# Patient Record
Sex: MALE | Race: WHITE | NOT HISPANIC OR LATINO | Employment: FULL TIME
[De-identification: names, ages, dates, MRNs, and addresses within clinical notes are randomized per-mention and may not be internally consistent; named-entity substitution may affect disease eponyms.]

---

## 2017-08-28 ENCOUNTER — RECORDS - HEALTHEAST (OUTPATIENT)
Dept: ADMINISTRATIVE | Facility: OTHER | Age: 32
End: 2017-08-28

## 2017-08-28 LAB
LAB AP CHARGES (HE HISTORICAL CONVERSION): NORMAL
PATH REPORT.COMMENTS IMP SPEC: NORMAL
PATH REPORT.COMMENTS IMP SPEC: NORMAL
PATH REPORT.FINAL DX SPEC: NORMAL
PATH REPORT.GROSS SPEC: NORMAL
PATH REPORT.MICROSCOPIC SPEC OTHER STN: NORMAL
PATH REPORT.MICROSCOPIC SPEC OTHER STN: NORMAL
PATH REPORT.RELEVANT HX SPEC: NORMAL
RESULT FLAG (HE HISTORICAL CONVERSION): NORMAL

## 2018-02-16 ENCOUNTER — RECORDS - HEALTHEAST (OUTPATIENT)
Dept: LAB | Facility: CLINIC | Age: 33
End: 2018-02-16

## 2018-02-19 LAB
BACTERIA SPEC CULT: ABNORMAL
BACTERIA SPEC CULT: ABNORMAL

## 2020-07-01 ENCOUNTER — COMMUNICATION - HEALTHEAST (OUTPATIENT)
Dept: TELEHEALTH | Facility: CLINIC | Age: 35
End: 2020-07-01

## 2020-07-01 ENCOUNTER — OFFICE VISIT - HEALTHEAST (OUTPATIENT)
Dept: FAMILY MEDICINE | Facility: CLINIC | Age: 35
End: 2020-07-01

## 2020-07-01 DIAGNOSIS — F32.1 CURRENT MODERATE EPISODE OF MAJOR DEPRESSIVE DISORDER WITHOUT PRIOR EPISODE (H): ICD-10-CM

## 2020-07-01 DIAGNOSIS — F41.1 GENERALIZED ANXIETY DISORDER: ICD-10-CM

## 2020-07-01 ASSESSMENT — ANXIETY QUESTIONNAIRES
3. WORRYING TOO MUCH ABOUT DIFFERENT THINGS: NEARLY EVERY DAY
5. BEING SO RESTLESS THAT IT IS HARD TO SIT STILL: MORE THAN HALF THE DAYS
IF YOU CHECKED OFF ANY PROBLEMS ON THIS QUESTIONNAIRE, HOW DIFFICULT HAVE THESE PROBLEMS MADE IT FOR YOU TO DO YOUR WORK, TAKE CARE OF THINGS AT HOME, OR GET ALONG WITH OTHER PEOPLE: SOMEWHAT DIFFICULT
GAD7 TOTAL SCORE: 15
2. NOT BEING ABLE TO STOP OR CONTROL WORRYING: MORE THAN HALF THE DAYS
1. FEELING NERVOUS, ANXIOUS, OR ON EDGE: MORE THAN HALF THE DAYS
4. TROUBLE RELAXING: MORE THAN HALF THE DAYS
6. BECOMING EASILY ANNOYED OR IRRITABLE: MORE THAN HALF THE DAYS
7. FEELING AFRAID AS IF SOMETHING AWFUL MIGHT HAPPEN: MORE THAN HALF THE DAYS

## 2020-07-01 ASSESSMENT — MIFFLIN-ST. JEOR: SCORE: 2053.23

## 2020-07-01 ASSESSMENT — PATIENT HEALTH QUESTIONNAIRE - PHQ9: SUM OF ALL RESPONSES TO PHQ QUESTIONS 1-9: 17

## 2020-07-09 ENCOUNTER — OFFICE VISIT - HEALTHEAST (OUTPATIENT)
Dept: BEHAVIORAL HEALTH | Facility: CLINIC | Age: 35
End: 2020-07-09

## 2020-07-09 DIAGNOSIS — F32.1 CURRENT MODERATE EPISODE OF MAJOR DEPRESSIVE DISORDER WITHOUT PRIOR EPISODE (H): ICD-10-CM

## 2020-07-09 DIAGNOSIS — F41.1 GENERALIZED ANXIETY DISORDER: ICD-10-CM

## 2020-07-20 ENCOUNTER — OFFICE VISIT - HEALTHEAST (OUTPATIENT)
Dept: BEHAVIORAL HEALTH | Facility: CLINIC | Age: 35
End: 2020-07-20

## 2020-07-20 DIAGNOSIS — F32.1 CURRENT MODERATE EPISODE OF MAJOR DEPRESSIVE DISORDER WITHOUT PRIOR EPISODE (H): ICD-10-CM

## 2020-07-20 DIAGNOSIS — F41.1 GENERALIZED ANXIETY DISORDER: ICD-10-CM

## 2020-07-28 ENCOUNTER — OFFICE VISIT - HEALTHEAST (OUTPATIENT)
Dept: FAMILY MEDICINE | Facility: CLINIC | Age: 35
End: 2020-07-28

## 2020-07-28 DIAGNOSIS — F32.1 CURRENT MODERATE EPISODE OF MAJOR DEPRESSIVE DISORDER WITHOUT PRIOR EPISODE (H): ICD-10-CM

## 2020-07-28 DIAGNOSIS — F41.1 GENERALIZED ANXIETY DISORDER: ICD-10-CM

## 2020-07-28 ASSESSMENT — ANXIETY QUESTIONNAIRES
6. BECOMING EASILY ANNOYED OR IRRITABLE: SEVERAL DAYS
5. BEING SO RESTLESS THAT IT IS HARD TO SIT STILL: NOT AT ALL
2. NOT BEING ABLE TO STOP OR CONTROL WORRYING: SEVERAL DAYS
7. FEELING AFRAID AS IF SOMETHING AWFUL MIGHT HAPPEN: NOT AT ALL
IF YOU CHECKED OFF ANY PROBLEMS ON THIS QUESTIONNAIRE, HOW DIFFICULT HAVE THESE PROBLEMS MADE IT FOR YOU TO DO YOUR WORK, TAKE CARE OF THINGS AT HOME, OR GET ALONG WITH OTHER PEOPLE: SOMEWHAT DIFFICULT
3. WORRYING TOO MUCH ABOUT DIFFERENT THINGS: SEVERAL DAYS
4. TROUBLE RELAXING: NOT AT ALL
GAD7 TOTAL SCORE: 4
1. FEELING NERVOUS, ANXIOUS, OR ON EDGE: SEVERAL DAYS

## 2020-07-28 ASSESSMENT — PATIENT HEALTH QUESTIONNAIRE - PHQ9: SUM OF ALL RESPONSES TO PHQ QUESTIONS 1-9: 6

## 2020-08-04 ENCOUNTER — OFFICE VISIT - HEALTHEAST (OUTPATIENT)
Dept: FAMILY MEDICINE | Facility: CLINIC | Age: 35
End: 2020-08-04

## 2020-08-04 DIAGNOSIS — Z30.09 ENCOUNTER FOR VASECTOMY COUNSELING: ICD-10-CM

## 2020-08-04 ASSESSMENT — MIFFLIN-ST. JEOR: SCORE: 2065.7

## 2020-08-27 ENCOUNTER — COMMUNICATION - HEALTHEAST (OUTPATIENT)
Dept: FAMILY MEDICINE | Facility: CLINIC | Age: 35
End: 2020-08-27

## 2020-08-27 DIAGNOSIS — F32.1 CURRENT MODERATE EPISODE OF MAJOR DEPRESSIVE DISORDER WITHOUT PRIOR EPISODE (H): ICD-10-CM

## 2020-08-27 DIAGNOSIS — F41.1 GENERALIZED ANXIETY DISORDER: ICD-10-CM

## 2020-09-06 ENCOUNTER — COMMUNICATION - HEALTHEAST (OUTPATIENT)
Dept: FAMILY MEDICINE | Facility: CLINIC | Age: 35
End: 2020-09-06

## 2020-09-11 ENCOUNTER — AMBULATORY - HEALTHEAST (OUTPATIENT)
Dept: FAMILY MEDICINE | Facility: CLINIC | Age: 35
End: 2020-09-11

## 2020-09-11 DIAGNOSIS — Z30.2 ENCOUNTER FOR VASECTOMY: ICD-10-CM

## 2020-09-15 LAB
LAB AP CHARGES (HE HISTORICAL CONVERSION): NORMAL
PATH REPORT.COMMENTS IMP SPEC: NORMAL
PATH REPORT.FINAL DX SPEC: NORMAL
PATH REPORT.GROSS SPEC: NORMAL
PATH REPORT.MICROSCOPIC SPEC OTHER STN: NORMAL
PATH REPORT.RELEVANT HX SPEC: NORMAL
RESULT FLAG (HE HISTORICAL CONVERSION): NORMAL

## 2020-10-11 ENCOUNTER — COMMUNICATION - HEALTHEAST (OUTPATIENT)
Dept: FAMILY MEDICINE | Facility: CLINIC | Age: 35
End: 2020-10-11

## 2020-10-12 ENCOUNTER — COMMUNICATION - HEALTHEAST (OUTPATIENT)
Dept: CARE COORDINATION | Facility: CLINIC | Age: 35
End: 2020-10-12

## 2020-10-12 ENCOUNTER — COMMUNICATION - HEALTHEAST (OUTPATIENT)
Dept: SCHEDULING | Facility: CLINIC | Age: 35
End: 2020-10-12

## 2020-10-12 ENCOUNTER — AMBULATORY - HEALTHEAST (OUTPATIENT)
Dept: CARE COORDINATION | Facility: CLINIC | Age: 35
End: 2020-10-12

## 2020-10-12 DIAGNOSIS — U07.1 2019 NOVEL CORONAVIRUS DISEASE (COVID-19): ICD-10-CM

## 2020-10-15 ENCOUNTER — RESULTS ONLY (OUTPATIENT)
Dept: LAB | Age: 35
End: 2020-10-15

## 2020-10-15 ENCOUNTER — RESEARCH ENCOUNTER (OUTPATIENT)
Dept: RESEARCH | Facility: CLINIC | Age: 35
End: 2020-10-15

## 2020-10-15 DIAGNOSIS — Z00.6 EXAMINATION OF PARTICIPANT IN CLINICAL TRIAL: Primary | ICD-10-CM

## 2020-10-15 LAB
CREAT SERPL-MCNC: 0.97 MG/DL (ref 0.66–1.25)
GFR SERPL CREATININE-BSD FRML MDRD: >90 ML/MIN/{1.73_M2}
POTASSIUM SERPL-SCNC: 4 MMOL/L (ref 3.4–5.3)

## 2020-10-18 ENCOUNTER — VIRTUAL VISIT (OUTPATIENT)
Dept: URGENT CARE | Facility: CLINIC | Age: 35
End: 2020-10-18
Payer: COMMERCIAL

## 2020-10-18 ENCOUNTER — NURSE TRIAGE (OUTPATIENT)
Dept: NURSING | Facility: CLINIC | Age: 35
End: 2020-10-18

## 2020-10-18 ENCOUNTER — RECORDS - HEALTHEAST (OUTPATIENT)
Dept: SCHEDULING | Facility: CLINIC | Age: 35
End: 2020-10-18

## 2020-10-18 ENCOUNTER — COMMUNICATION - HEALTHEAST (OUTPATIENT)
Dept: SCHEDULING | Facility: CLINIC | Age: 35
End: 2020-10-18

## 2020-10-18 DIAGNOSIS — U07.1 2019 NOVEL CORONAVIRUS DISEASE (COVID-19): Primary | ICD-10-CM

## 2020-10-18 DIAGNOSIS — R11.0 NAUSEA: ICD-10-CM

## 2020-10-18 DIAGNOSIS — J98.01 BRONCHOSPASM: ICD-10-CM

## 2020-10-18 PROCEDURE — 99203 OFFICE O/P NEW LOW 30 MIN: CPT | Mod: 95 | Performed by: INTERNAL MEDICINE

## 2020-10-18 RX ORDER — ONDANSETRON 4 MG/1
4-8 TABLET, FILM COATED ORAL EVERY 8 HOURS PRN
Qty: 20 TABLET | Refills: 0 | Status: SHIPPED | OUTPATIENT
Start: 2020-10-18 | End: 2020-11-17

## 2020-10-18 RX ORDER — ALBUTEROL SULFATE 90 UG/1
2 AEROSOL, METERED RESPIRATORY (INHALATION) EVERY 6 HOURS PRN
Qty: 1 INHALER | Refills: 0 | Status: SHIPPED | OUTPATIENT
Start: 2020-10-18 | End: 2022-04-21

## 2020-10-18 NOTE — TELEPHONE ENCOUNTER
Oli and his wife were checking on when their appointment is or was supposed to be with virtual visit.  Looked on appointment desk and it is for 7:15 pm this evening. They thought it was supposed to be about now. They will await the call.

## 2020-10-19 ENCOUNTER — NURSE TRIAGE (OUTPATIENT)
Dept: NURSING | Facility: CLINIC | Age: 35
End: 2020-10-19

## 2020-10-19 NOTE — TELEPHONE ENCOUNTER
Prescription for ondansetron sent to pharmacy. Is it okay to use it together with citalopram? It can cause cardiac arrhythmia. Please call pharmacy. Should she fill the ondansetron or do you want to switch that to something else?  [phone] [facility] pharmacist.  Thank you,  Juliana Dwyer RN  [facility]

## 2020-10-19 NOTE — PROGRESS NOTES
"SUBJECTIVE:  Oli Gage, a 35 year old male, presents for evaluation of some worsening symptoms associated with a positive COVID diagnosis.  He was diagnosed on 10/10.  He is noting an increase in coughing which is coming in paroxysms.  More associated with the more he is talking or if he is taking a deep breath.  Denies any prior history of asthma (his mom and brother do have asthma).  He did have some chest tightness yesterday but this is better.  When he is at rest, he feels \"fine.\"  Noting temperature of 99.0.  Was up to 101 yesterday in the evening.  He is noting dysgeusia.  He is taking in solids and liquids and had some vomiting today.  Also onset of some diarrhea.  Vomited x 3, dry heaving, watery stomach contents.  Denies hematemesis.  States that he does ok if he is drinking fluids.       PMH: anxiety    CMED: Lexapro; no other prescription meds    OBJECTIVE:  GEN: alert and conversant, normal mental status  RESP: frequent hacking bronchospastic cough; speaking full sentences    ASSESSMENT/PLAN:    ICD-10-CM    1. 2019 novel coronavirus disease (COVID-19)  U07.1 ondansetron (ZOFRAN) 4 MG tablet     albuterol (PROAIR HFA/PROVENTIL HFA/VENTOLIN HFA) 108 (90 Base) MCG/ACT inhaler   2. Bronchospasm  J98.01 albuterol (PROAIR HFA/PROVENTIL HFA/VENTOLIN HFA) 108 (90 Base) MCG/ACT inhaler   3. Nausea  R11.0 ondansetron (ZOFRAN) 4 MG tablet     This patient is in his second week of COVID-19 and is showing evidence of a more significant course of illness.  He is at risk of further decline but doesn't currently show evidence of a level of decompensation that would suggest a need for the supportive care that is only available in an acute care hospital.  He is still able to hydrate orally and is not demonstrating any early symptoms of dehydration or hemodynamic compromise.  He continues to mentate normally.  He is not particularly dyspneic and the cough is an annoying bronchospastic hack but can be suppressed " by resting, not speaking.  Discussed symptoms to watch for including evidence of dehydration or impaired perfusion or worsening oxygenation (darkening urine, diminished urine volume, unable to keep fluids down, postural intolerance, poor color, pallor, diaphoresis, exertional dyspnea, changes in mental status).  If any of these symptoms show up he should go to ER.  In the meantime, will continue supportive care at home.  Will add PRN ondansetron and albuterol as symptom managers and focus on clear liquids for the time being.    Total time spent in phone consultation was 22 minutes.    Geovanny Garrett MD

## 2020-10-20 ENCOUNTER — RECORDS - HEALTHEAST (OUTPATIENT)
Dept: SCHEDULING | Facility: CLINIC | Age: 35
End: 2020-10-20

## 2020-10-20 ENCOUNTER — NURSE TRIAGE (OUTPATIENT)
Dept: NURSING | Facility: CLINIC | Age: 35
End: 2020-10-20

## 2020-10-20 NOTE — TELEPHONE ENCOUNTER
"[facility] Pharmacist is calling regarding Ondansetron and Citalopram.  Pharmacy is requesting a different medication as Oli is currently taking citalopram and interaction could be cardiac arrhythmia.  Please phone pharmacy at [phone].      Reason for Disposition    Pharmacy calling with prescription questions and triager unable to answer question    Additional Information    Negative: MORE THAN A DOUBLE DOSE of a prescription or over-the-counter (OTC) drug    Negative: [1] DOUBLE DOSE (an extra dose or lesser amount) of over-the-counter (OTC) drug AND [2] any symptoms (e.g., dizziness, nausea, pain, sleepiness)    Negative: [1] DOUBLE DOSE (an extra dose or lesser amount) of prescription drug AND [2] any symptoms (e.g., dizziness, nausea, pain, sleepiness)    Negative: Took another person's prescription drug    Negative: [1] DOUBLE DOSE (an extra dose or lesser amount) of prescription drug AND [2] NO symptoms (Exception: a double dose of antibiotics)    Negative: Diabetes drug error or overdose (e.g., insulin or extra dose)    Negative: [1] Request for URGENT new prescription or refill of \"essential\" medication (i.e., likelihood of harm to patient if not taken) AND [2] triager unable to fill per unit policy    Negative: [1] Prescription not at pharmacy AND [2] was prescribed today by PCP    Protocols used: MEDICATION QUESTION CALL-A-      "

## 2020-10-29 ENCOUNTER — RESULTS ONLY (OUTPATIENT)
Dept: LAB | Age: 35
End: 2020-10-29

## 2020-10-29 LAB
CREAT SERPL-MCNC: 0.92 MG/DL (ref 0.66–1.25)
GFR SERPL CREATININE-BSD FRML MDRD: >90 ML/MIN/{1.73_M2}
POTASSIUM SERPL-SCNC: 4.1 MMOL/L (ref 3.4–5.3)

## 2020-11-04 DIAGNOSIS — U07.1 2019 NOVEL CORONAVIRUS DISEASE (COVID-19): ICD-10-CM

## 2020-11-04 DIAGNOSIS — J98.01 BRONCHOSPASM: ICD-10-CM

## 2020-11-04 RX ORDER — ALBUTEROL SULFATE 90 UG/1
AEROSOL, METERED RESPIRATORY (INHALATION)
Qty: 8.5 INHALER | Refills: 0 | OUTPATIENT
Start: 2020-11-04

## 2020-11-16 NOTE — PROGRESS NOTES
Patient pre screened by HE  who passed on to DEM Research Nurse with patient permission to call regarding research.  Completed further EMR screening and reached out to subject 10/14/2020 to discuss study. Explained purpose of study, schedule, risks and benefits, alternatives to treatment and voluntary nature.  Also stated patient could withdraw at any time and study participation does not affect clinical care whatsoever. After answering all questions and concerns, patient agreed to participate - further I/E screening completed per phone conversation and patient accepted [place] [employer] [facility] for a home visit on 10/15/20 at 1:30pm for safety labs (Cr and K) and baseline B/P check.  Obtained consent via phone using the [employer] platform.  Subject sent signed copy of consent through secure email.       After MME home visit completed - I/E criteria met per review with PI, Chivo Parry MD, 10/15/2020 at 15:30  and subject deemed eligible for randomization.  IDS orders placed in Epic by PI and subject randomized 10/15/20 at 15:38.    Study Title: Randomized controlled trial of Losartan for outpatients with COVID-19  : Seth Parry MD and Mark Reynolds MD  24-hour telephone: [phone]    Screening  Date: 10/14/20 and 10/15/20   Subject enrolled under protocol version: 2.1 ([id number])    Inclusion Criteria (all answers must be marked yes)   1. Positive laboratory test for COVID-19 based on local laboratory standard [x] Yes [] No   2. Age greater than or equal to 18 years of age [x] Yes [] No   3. At least one symptom of coronavirus as utilized by the CDC within 24 hours of enrollment. Current symptoms include: Fever or chills, cough, shortness of breath or difficulty breathing, fatigue, muscle or body aches, headache, new loss of taste or smell, sore throat, congestion or runny nose, nausea or vomiting, and diarrhea.  https://www.cdc.gov/coronavirus/2019-ncov/symptoms-testing/symptoms.html  Symptom(s): fever, cough, HA, fatigue, muscle aches [x] Yes [] No     Exclusion Criteria (all answers must be marked no)   1. Randomization > 7 days of symptom onset     2. Currently taking an angiotensin converting enzyme inhibitor (ACEi) or angiotensin receptor blocker (ARB) [] Yes [x] No   3. Prior reaction or intolerance to an ARB or ACE inhibitor, including but not limited to angioedema [] Yes [x] No   4. Pregnant or breastfeeding women [] Yes [x] No   5. Women able to have children not currently taking a protocol allowed version of contraception: intrauterine device, Depo-formulation of hormonal contraception (e.g. medroxyprogesterone acetate / Depo-Provera), subcutaneous contraceptive (e.g Nexplanon), daily oral contraceptives with verbalized commitment to taking daily throughout the study, condom use or abstinence during the study. All women of child bearing age enrolled in this fashion will be informed of the teratogenic risks [] Yes [x] No   6. Patient reported history or electronic medical record history of kidney disease, defined as:   o Any history of dialysis  o History of chronic kidney disease stage IV  o Estimated Glomerular Filtration Rate (eGFR) of <30 ml/min/1.73 m2 at the time of randomization  o Other kidney disease that in the opinion of the investigator, would affect losartan clearance [] Yes [x] No   7. Patient reported dehydration and significantly decreased urine output in the past 72 hours [] Yes [x] No   8. Most recent systolic blood pressure prior to enrollment < 110 mmHg [] Yes [x] No   9. Patient reported history or electronic medical record history of severe liver disease, defined as:   o Cirrhosis  o History of hepatitis B or C  o Other liver disease that in the opinion of the investigator, would affect losartan clearance  o Documented AST or ALT > 3 times the upper limit of normal measured within 3 months of  randomization (if available in electronic medical record) [] Yes [x] No   10. Potassium > 5.0 mmol/L (must have been measured within 1 month) of enrollment [] Yes [x] No   11. Concurrent treatment with aliskirin [] Yes [x] No   12. Inability to obtain informed consent [] Yes [x] No   13. Enrollment in another blinded randomized clinical trial for COVID [] Yes [x] No     Subject has met all inclusion criteria and no exclusion criteria have been met.     Anisha Perez RN  Research Nurse

## 2021-01-04 ENCOUNTER — HOSPITAL ENCOUNTER (OUTPATIENT)
Dept: RESEARCH | Facility: CLINIC | Age: 36
End: 2021-01-04
Attending: SURGERY
Payer: COMMERCIAL

## 2021-02-04 ENCOUNTER — OFFICE VISIT - HEALTHEAST (OUTPATIENT)
Dept: FAMILY MEDICINE | Facility: CLINIC | Age: 36
End: 2021-02-04

## 2021-02-04 DIAGNOSIS — F41.1 GENERALIZED ANXIETY DISORDER: ICD-10-CM

## 2021-02-04 DIAGNOSIS — F32.5 MAJOR DEPRESSION IN COMPLETE REMISSION (H): ICD-10-CM

## 2021-02-04 ASSESSMENT — ANXIETY QUESTIONNAIRES
3. WORRYING TOO MUCH ABOUT DIFFERENT THINGS: SEVERAL DAYS
7. FEELING AFRAID AS IF SOMETHING AWFUL MIGHT HAPPEN: SEVERAL DAYS
1. FEELING NERVOUS, ANXIOUS, OR ON EDGE: SEVERAL DAYS
5. BEING SO RESTLESS THAT IT IS HARD TO SIT STILL: SEVERAL DAYS
GAD7 TOTAL SCORE: 5
2. NOT BEING ABLE TO STOP OR CONTROL WORRYING: SEVERAL DAYS
IF YOU CHECKED OFF ANY PROBLEMS ON THIS QUESTIONNAIRE, HOW DIFFICULT HAVE THESE PROBLEMS MADE IT FOR YOU TO DO YOUR WORK, TAKE CARE OF THINGS AT HOME, OR GET ALONG WITH OTHER PEOPLE: NOT DIFFICULT AT ALL
6. BECOMING EASILY ANNOYED OR IRRITABLE: NOT AT ALL
4. TROUBLE RELAXING: NOT AT ALL

## 2021-02-04 ASSESSMENT — PATIENT HEALTH QUESTIONNAIRE - PHQ9: SUM OF ALL RESPONSES TO PHQ QUESTIONS 1-9: 2

## 2021-05-26 ASSESSMENT — PATIENT HEALTH QUESTIONNAIRE - PHQ9
SUM OF ALL RESPONSES TO PHQ QUESTIONS 1-9: 17
SUM OF ALL RESPONSES TO PHQ QUESTIONS 1-9: 6

## 2021-05-27 ASSESSMENT — PATIENT HEALTH QUESTIONNAIRE - PHQ9: SUM OF ALL RESPONSES TO PHQ QUESTIONS 1-9: 2

## 2021-05-28 ASSESSMENT — ANXIETY QUESTIONNAIRES
GAD7 TOTAL SCORE: 4
GAD7 TOTAL SCORE: 15
GAD7 TOTAL SCORE: 5

## 2021-05-29 ENCOUNTER — RECORDS - HEALTHEAST (OUTPATIENT)
Dept: ADMINISTRATIVE | Facility: CLINIC | Age: 36
End: 2021-05-29

## 2021-06-04 VITALS
HEART RATE: 81 BPM | WEIGHT: 242 LBS | SYSTOLIC BLOOD PRESSURE: 118 MMHG | BODY MASS INDEX: 33.28 KG/M2 | DIASTOLIC BLOOD PRESSURE: 72 MMHG | OXYGEN SATURATION: 97 %

## 2021-06-04 VITALS
OXYGEN SATURATION: 98 % | TEMPERATURE: 98.3 F | HEIGHT: 72 IN | HEART RATE: 98 BPM | RESPIRATION RATE: 16 BRPM | DIASTOLIC BLOOD PRESSURE: 80 MMHG | WEIGHT: 241 LBS | BODY MASS INDEX: 32.64 KG/M2 | SYSTOLIC BLOOD PRESSURE: 124 MMHG

## 2021-06-04 VITALS
TEMPERATURE: 98.3 F | HEART RATE: 84 BPM | BODY MASS INDEX: 33.77 KG/M2 | SYSTOLIC BLOOD PRESSURE: 120 MMHG | WEIGHT: 249 LBS | DIASTOLIC BLOOD PRESSURE: 80 MMHG | OXYGEN SATURATION: 97 %

## 2021-06-04 VITALS
HEART RATE: 80 BPM | DIASTOLIC BLOOD PRESSURE: 70 MMHG | BODY MASS INDEX: 32.78 KG/M2 | SYSTOLIC BLOOD PRESSURE: 122 MMHG | WEIGHT: 242 LBS | HEIGHT: 72 IN

## 2021-06-05 VITALS
BODY MASS INDEX: 33.92 KG/M2 | WEIGHT: 250.1 LBS | DIASTOLIC BLOOD PRESSURE: 82 MMHG | SYSTOLIC BLOOD PRESSURE: 118 MMHG | OXYGEN SATURATION: 98 % | HEART RATE: 90 BPM

## 2021-06-09 NOTE — PROGRESS NOTES
ASSESSMENT/PLAN:       1. Generalized anxiety disorder     - AMB REFERRAL TO MENTAL HEALTH AND ADDICTION  - Adult (18+); Outpatient Treatment; Individual/Couples/Family/Group Therapy/Health Psychology; [facility] [phone]; We will contact you to schedule the appointment or ple...  - escitalopram oxalate (LEXAPRO) 10 MG tablet; Take 1 tablet (10 mg total) by mouth daily.  Dispense: 30 tablet; Refill: 2    2. Current moderate episode of major depressive disorder without prior episode (H)     - AMB REFERRAL TO MENTAL HEALTH AND ADDICTION  - Adult (18+); Outpatient Treatment; Individual/Couples/Family/Group Therapy/Health Psychology; [facility] [phone]; We will contact you to schedule the appointment or ple...  - escitalopram oxalate (LEXAPRO) 10 MG tablet; Take 1 tablet (10 mg total) by mouth daily.  Dispense: 30 tablet; Refill: 2    I shared with the patient that I feel behavioral therapy as well as pharmacologic therapy are both important and orders were placed for both.  Begin Lexapro 10 mg daily and would suggest he take that in the morning.  We talked about other medications for sleep but he preferred to not take those medicines but might try melatonin 5 mg nightly.  Behavioral activation would be to try to force himself to gradually involve himself with exercise and with his hobbies.  I feel that the counseling could be helpful to assist him with some of the closure and bereavement issues related to his father's death.    We will plan to see the patient back in the clinic in about 3 to 4 weeks for follow-up visit    The following are part of a depression follow up plan for the patient:  implementation of measures to provide psychological support, mental health care assessment and management of mental health treatment    Jordy Mckinnon MD      PROGRESS NOTE   7/2/2020    SUBJECTIVE:  Oli Das is a 34 y.o. male  who presents for   Chief  Complaint   Patient presents with     Depression   New patient establishing care for symptoms consistent with anxiety and depression.  The patient has never been treated for these conditions in the past.    1. Generalized anxiety disorder  The patient has significant worry and restlessness.  Stress has included recently his wife having a vertebral artery dissection.  Also this past year his father  in August and through the year required a lot of medical appointments and care which Oli was vitally involved with.  This was very difficult for the patient and he was very close to his father.  The patient has 3 children age 8, 5 and 2 with the 2 oldest children being sons.  The patient does not have any panic symptoms.    2. Current moderate episode of major depressive disorder without prior episode (H)  The patient's primary symptom with depression is that of a loss of interest in usual enjoyable activities.  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  He has lost interest in these hobbies and areas of interest.  He has been able to do his usual job but has a difficult time getting up in the morning.  He has difficulty with falling asleep and staying asleep.  He has tried some over-the-counter sleep aids but does not like how they make him feel in the morning.  The patient has had a decreased appetite however has actually gained some weight.  He has not been as physically active.      Current Outpatient Medications   Medication Sig Dispense Refill     escitalopram oxalate (LEXAPRO) 10 MG tablet Take 1 tablet (10 mg total) by mouth daily. 30 tablet 2     No current facility-administered medications for this visit.        Social History     Tobacco Use   Smoking Status Never Smoker   Smokeless Tobacco Never Used           OBJECTIVE:        No results found for this or any previous visit  "(from the past 240 hour(s)).    Vitals:    07/01/20 1517   BP: 124/80   Pulse: 98   Resp: 16   Temp: 98.3  F (36.8  C)   SpO2: 98%   Weight: (!) 241 lb (109.3 kg)   Height: 5' 11.5\" (1.816 m)     Weight: (!) 241 lb (109.3 kg)          Physical Exam:  GENERAL APPEARANCE: Pleasant 34-year-old male, NAD, well hydrated, well nourished  SKIN:  Normal skin turgor, no lesions/rashes   Mental status exam: PHQ 9 score 17 and yas 7 score 15  Significant symptoms of worry, feeling restless and poor sleep with decreased and interest in activities that he usually has enjoyed and low energy.  Patient is casually dressed and appropriately groomed with good eye contact and normal speech pattern.  No delusional thinking paranoia or suicidal ideations.  Affect is slightly flat with no psychomotor agitation.   EXTREMITY: no edema and full ROM of all joints  NEURO: no focal findings    "

## 2021-06-10 NOTE — TELEPHONE ENCOUNTER
FYI - Status Update  Who is Calling: [facility]  Update: Please resend as a 90 day supply.  Okay to leave a detailed message?:  No return call needed

## 2021-06-10 NOTE — PROGRESS NOTES
"Assessment/Plan:    1. Encounter for vasectomy counseling  Contraception counseling reviewed.  Pre-vasectomy literature provided.  Risk benefits and alternatives to vasectomy discussed.  Consent form reviewed and signed by patient.  Patient will schedule for vasectomy at his convenience likely 2020.          Subjective:    Oli Das is seen today for contraception counseling.  Desires permanent sterilization.  .  3 children.  No personal or family history of bleeding disorder etc.  Past medical social family history reviewed and updated as noted below.  Comprehensive review of systems as above otherwise all negative.     - Marquita  3 children (2 sons - 8 and 5 and daughter - 2)  Tobacco: none  EtOH: rarely  Mom - diagnosed ~ 62 with colon CA  Dad -  69 sepsis due to \"cancer\" (myelofibrosis)  Siblings: 1 younger bro -   Surgeries: adenoids in ; wisdom teeth ~ ; septoplasty (); pilonidal cyst excision  Hospitalizations: random seizure in  (workup negative) - \"sometimes your brain just needs a reset\" was on meds for two years  Work: tech support sitting at a desk; volunteer \"reserve office\" with police  Hobbies: history (of policing, etc.)    History reviewed. No pertinent surgical history.     Family History   Problem Relation Age of Onset     Asthma Mother      Depression Mother      Myelodysplastic syndrome Father      Mental illness Brother      Hypertension Brother      No Medical Problems Daughter      No Medical Problems Son         History reviewed. No pertinent past medical history.     Social History     Tobacco Use     Smoking status: Never Smoker     Smokeless tobacco: Never Used   Substance Use Topics     Alcohol use: Not Currently     Drug use: Not Currently        Current Outpatient Medications   Medication Sig Dispense Refill     escitalopram oxalate (LEXAPRO) 10 MG tablet Take 1.5 tablets (15 mg total) by mouth daily. 60 tablet 6     No " current facility-administered medications for this visit.           Objective:    Vitals:    08/04/20 1301   BP: 122/70   Patient Site: Right Arm   Patient Position: Sitting   Cuff Size: Adult Large   Pulse: 80   Weight: (!) 242 lb (109.8 kg)   Height: 6' (1.829 m)      Body mass index is 32.82 kg/m .    Alert.  No apparent distress.  Pleasant.  Chest clear.  Cardiac exam regular.  Genitalia circumcised male.  Testes descended bilaterally.  No hydrocele or varicocele.  No inguinal hernia.      This note has been dictated using voice recognition software and as a result may contain minor grammatical errors and unintended word substitutions.

## 2021-06-10 NOTE — PROGRESS NOTES
ASSESSMENT/PLAN:       1. Generalized anxiety disorder     - escitalopram oxalate (LEXAPRO) 10 MG tablet; Take 1.5 tablets (15 mg total) by mouth daily.  Dispense: 60 tablet; Refill: 6  The plan is to increase the escitalopram to 15 mg daily.  If after 2 to 3 weeks the patient feels that he still not getting enough benefit but not having any side effects he could increase to 20 mg daily as a maximum dose.  We did talk about how long to stay on the medicine which should be at least 9 to 12 months and that he should not stop the medicine abruptly.      2. Current moderate episode of major depressive disorder without prior episode (H)     - escitalopram oxalate (LEXAPRO) 10 MG tablet; Take 1.5 tablets (15 mg total) by mouth daily.  Dispense: 60 tablet; Refill: 6  Encouraged the patient to give behavioral health a few more tries and hopefully he will start getting some meaningful information back from the therapist rather than the therapist primarily gathering information from him.    The patient can use my chart communicate with me if there are issues.  If doing well and on a stable dose we will plan to see the patient back in 6 months for a follow-up.  This would be to make sure that the patient is in remission.      The following are part of a depression follow up plan for the patient:  under care of mental health counselor, mental health screening assessment and patient follow-up to return when and if necessary    Jordy Mckinnon MD      PROGRESS NOTE   7/28/2020    SUBJECTIVE:  Oli Das is a 34 y.o. male  who presents for   Chief Complaint   Patient presents with     Mental Health Problem     Four week follow up.      Patient seen today for 4-week follow-up of his anxiety and depression.  1. Generalized anxiety disorder  The patient feels that his primary symptom is anxiety will say that likely he has had a mild overlying issue with anxiety for a number of years.  It seemed to tip the anxiety over the  edge this past year was his father's death.  The patient has seen a behavioral health specialist by a virtual visit on 2 occasions.  Is mostly been information gathering to this point.  The patient has been on esCitalopram 10 mg daily and has noticed a significant improvement in his anxiety.  Still does have some restlessness and irritability.  No side effects from the Lexapro.  The patient has been sleeping better but there 5-year-old son has some difficulties with sleep which does interfere with the patient's sleep as well.    2. Current moderate episode of major depressive disorder without prior episode (H)  The patient feels that his depressive symptoms have improved as well.  He is starting to show some interest in activities that he had not been doing.  More energy although does feel that his energy is very dependent on how much sleep he gets.  He does not have any suicidal thoughts.    There is no problem list on file for this patient.      Current Outpatient Medications   Medication Sig Dispense Refill     escitalopram oxalate (LEXAPRO) 10 MG tablet Take 1.5 tablets (15 mg total) by mouth daily. 60 tablet 6     No current facility-administered medications for this visit.        Social History     Tobacco Use   Smoking Status Never Smoker   Smokeless Tobacco Never Used           OBJECTIVE:        No results found for this or any previous visit (from the past 240 hour(s)).    Vitals:    07/28/20 1625   BP: 118/72   Patient Site: Left Arm   Patient Position: Sitting   Cuff Size: Adult Large   Pulse: 81   SpO2: 97%   Weight: (!) 242 lb (109.8 kg)     Weight: (!) 242 lb (109.8 kg)          Physical Exam:  GENERAL APPEARANCE: 34-year-old male very pleasant, NAD, well hydrated, well nourished  SKIN:  Normal skin turgor, no lesions/rashes   Mental status exam: The patient is casually dressed and well-groomed with good eye contact and normal speech pattern.  His thought processes goal-directed with no delusional  thinking or paranoia.  His affect demonstrates a good range with no irritability or psychomotor agitation.  No suicidal thoughts or intent.  PHQ 9 score was 17 and now is 6  Dima 7 score was 15 now is 4  NEURO: no focal findings

## 2021-06-11 NOTE — PROGRESS NOTES
"Vasectomy    Date/Time: 2020 3:03 PM  Performed by: Steven Simon MD  Authorized by: Steven Simon MD         Vasectomy Procedure Note    Indications: 34 y.o. male desiring permanent sterilization    Pre-operative Diagnosis: Undesired fertility    Post-operative Diagnosis: Undesired fertility    Anesthesia: 1% lidocaine, 3 ml    Procedure Details:    Oli Das  is seen today for scheduled vasectomy.  Patient desires permanent sterilization.  Consent form previously reviewed and signed by patient.   Consent form reviewed prior to procedure with physician statement signed.   Patient elects to continue with scheduled procedure.     - Marquita  3 children (2 sons - 8 and 5 and daughter - 2)  Tobacco: none  EtOH: rarely  Mom - diagnosed ~ 62 with colon CA  Dad -  69 sepsis due to \"cancer\" (myelofibrosis)  Siblings: 1 younger bro -   Surgeries: adenoids in ; wisdom teeth ~ ; septoplasty (); pilonidal cyst excision  Hospitalizations: random seizure in  (workup negative) - \"sometimes your brain just needs a reset\" was on meds for two years  Work: tech support sitting at a desk; volunteer \"reserve office\" with police  Hobbies: history (of policing, etc.)    Oli Das was prepped and draped in usual sterile fashion.  Right vas deferens isolated subcutaneously.  1% lidocaine injected superficially then to vas deferens.  15 blade incision performed.  Curved hemostat for blunt dissection.  Towel clip for vas deferens isolation.  Vas deferens sheath removed exposing normal-appearing vas deferens.  4-O chromic suture both proximal and distal segment of vas deferens with central portion excised for pathology.  Electrocautery of vas deferens ends performed.  Distal end then replacing into fascia with 5-0 chromic suture.  Good hemostasis noted.  Vas deferens then replacing into scrotum.  Single 4-0 chromic suture for skin incision closure.    Left vas deferens then " isolated in a similar fashion.  1% lidocaine injected superficially and then to level of vas deferens and surrounding tissue.  15 blade incision performed.  Curved hemostat for blunt dissection.  Towel clip for vas deferens isolation.  Vas deferens sheath removed exposing normal-appearing vas deferens.  4-0 chromic suture both proximal and distal segment of vas deferens with central portion excised for pathology.  Electrocautery of vas deferens ends performed.  Distal and replaced in the fascia with 5-0 chromic suture.  Good hemostasis noted.  Vas deferens then replaced into scrotum.  Single 4-0 chromic for skin incision closure.  Triple antibiotic with 4 x 4 gauze pads placed at bilateral incision sites.      Specimen: vas segment, bilateral    Condition: Stable    Complications: none    Plan:  1. Continue contraception until negative sperm analysis. Semen count after 20-25 ejaculations and 12 weeks s/p vasectomy.  2. Warning signs of infection were reviewed.   3. Written home care instructions provided.  Backup contraception until confirmed sterility.  May resume normal bathing after 24 hours.  Avoid strenuous activity times one week.  Ibuprofen or Tylenol OTC for pain management.  Notify with increased swelling, bleeding, or drainage.  Postvasectomy semen analysis in 12 weeks after 20-25 ejaculations to confirm desired sterility.  Call the clinic if excessive pain, bleeding or swelling.

## 2021-06-11 NOTE — TELEPHONE ENCOUNTER
Refill Approved    Rx renewed per Medication Renewal Policy. Medication was last renewed on 7/28/20, last OV 8/4/20.    Kaelyn Villa, [facility] Triage/Med Refill 8/30/2020     Requested Prescriptions   Pending Prescriptions Disp Refills     escitalopram oxalate (LEXAPRO) 10 MG tablet 60 tablet 6     Sig: Take 1.5 tablets (15 mg total) by mouth daily.       SSRI Refill Protocol  Passed - 8/27/2020 10:10 AM        Passed - PCP or prescribing provider visit in last year     Last office visit with prescriber/PCP: 7/28/2020 Jordy Mckinnon MD OR same dept: 7/28/2020 Jordy Mckinnon MD OR same specialty: 8/4/2020 Steven Simon MD  Last physical: Visit date not found Last MTM visit: Visit date not found   Next visit within 3 mo: Visit date not found  Next physical within 3 mo: Visit date not found  Prescriber OR PCP: Jordy Mckinnon MD  Last diagnosis associated with med order: 1. Current moderate episode of major depressive disorder without prior episode (H)  - escitalopram oxalate (LEXAPRO) 10 MG tablet; Take 1.5 tablets (15 mg total) by mouth daily.  Dispense: 60 tablet; Refill: 6    2. Generalized anxiety disorder  - escitalopram oxalate (LEXAPRO) 10 MG tablet; Take 1.5 tablets (15 mg total) by mouth daily.  Dispense: 60 tablet; Refill: 6    If protocol passes may refill for 12 months if within 3 months of last provider visit (or a total of 15 months).

## 2021-06-12 NOTE — PROGRESS NOTES
Mental Health Psychotherapy Note    Patient: Oli Gage    : 1985 MRN: [id number]    2020    Start time: 2:02 PM    Stop Time: 2:58 PM  Session # 1    Session was 53+ min in length due to first session requirements and rapport building.     Session Type: Patient is presenting for an Individual session.    Oli Gage is a 35 y.o. male is being seen today for anxiety related symptoms.  Chief Complaint   Patient presents with     Depression     Initial psychotherapy appointment     Anxiety     Grief and loss     Intake   .     Telemedicine Visit: The patient's condition can be safely assessed and treated via synchronous audio and visual telemedicine encounter.      Reason for Telemedicine Visit: Services only offered telehealth    Originating Site (Patient Location): Patient's home    Distant Site (Provider Location): Provider Remote Setting- Home Office    Consent:  The patient/guardian has verbally consented to: the potential risks and benefits of telemedicine (video visit) versus in person care; bill my insurance or make self-payment for services provided; and responsibility for payment of non-covered services.     Mode of Communication:  Video Conference via [employer]ervault    As the provider I attest to compliance with applicable laws and regulations related to telemedicine.    Those present for this visit: Patient and Provider    Psychotherapy intake in regards to ongoing symptom management of anxiety, grief    New symptoms or complaints: None    Functional Impairment:   Personal: 3  Family: 3  Social: 1  Work: 1    Clinical assessment of mental status:   Oli Gage presented on time.   He was oriented x3, open and cooperative, and dressed appropriately for this session and weather. His memory was Normal cognitive functioning .  His speech was  Within normal.  Language was typical and appropriate.  Concentration and focus is Within normal. Psychosis is not noted or reported.  "He reports his mood is Congruent w/content of speech and Anxious.  Affect is congruent with speech and is Congruent w/content of speech and Anxious.  Fund of knowledge is adequate. Insight is adequate for therapy.    ASSESSMENT: Current Emotional / Mental Status (status of significant symptoms):                          Patient PERSONAL SAFETY:No reported concerns              Patient denies current or recent suicidal ideation or behaviors.              Patient denies current or recent homicidal ideation or behaviors.              Patient denies current or recent self injurious behavior or ideation.              Patient denies other safety concerns.                           Recommended that patient call 911 or go to the local ED should there be a change in any of these risk factors.                Attitude:                                   Cooperative  Friendly Pleasant Attentive              Orientation:                               All: Person, Place, Time and Situation                Speech                          Rate / Production:       Normal/ Responsive Normal                           Volume:                       Normal               Mood:                                      Anxious  Normal              Thought Content:                    Clear               Thought Form:                        Coherent  Goal Directed  Logical               Insight:                                     Good  and Intellectual Insight    Patient's impression of their current status: The patient reports feeling anxious about his family's financial situation. The patient reports feeling \"absent\" from his family for the last few yeats and reports lack of motivation to complete household tasks. The patient states: \"There is something not right in how I feel.\" The patient also reports that his sleep is \"horrible\" and states: \"It has been terrible for 2 years.\"    Therapist impression of patients current state: This 35 y.o. " White or  male presents with anxiety and depression related symptoms. The patient appears anxious about his family's financial situation, as evident by his statements. The patient reports feeling withdrawn from his family and reports lack of motivation to complete household tasks. The patient appears as though he has not grieved the loss of his father, as evident by his statements.     Assessment tools used today include:  CGI and C-SSRS and can be found documented in Doc Flowsheets.       Type of psychotherapeutic technique provided: Client centered and Active listening, Rapport building      Progress toward short term goals: N/A first visit      Review of long term goals:  N/A first visit    Diagnosis:   1. Current moderate episode of major depressive disorder without prior episode (H)    2. Generalized anxiety disorder     First visit - no change      Plan and Follow-up:  Patient plans to continue taking the medication as prescribed. Patient plans to follow up with therapy in two weeks. Continue to build rapport with client/establish therapeutic relationship. Complete DA with patient.       Discharge Criteria/Planning: Patient will continue with follow-up until therapy can be discontinued without return of signs and symptoms.      I have reviewed the note as documented above.  This accurately captures the substance of my conversation with the patient.    As the provider I attest to compliance with applicable laws and regulations related to telemedicine.  Performed and documented by: ITZEL Smart   11/5/2020Performed and documented by 11/5/2020

## 2021-06-12 NOTE — TELEPHONE ENCOUNTER
Spouse reports COVID+, calling with worsening symptoms.  Pt has intermittent midsternal CP, nausea, a persistent fever, and a worsening cough.  Pt not able to keep much fluid down but he is urinating.     Disposition:  Virtual visit with  today.  Spouse verbalized understanding and had no further questions, call transferred to central scheduling.     COVID 19 Nurse Triage Plan/Patient Instructions    Please be aware that novel coronavirus (COVID-19) may be circulating in the community. If you develop symptoms such as fever, cough, or SOB or if you have concerns about the presence of another infection including coronavirus (COVID-19), please contact your health care provider or visit [email or web address].     Disposition/Instructions    Virtual Visit with provider recommended. Reference Visit Selection Guide.    Thank you for taking steps to prevent the spread of this virus.  o Limit your contact with others.  o Wear a simple mask to cover your cough.  o Wash your hands well and often.    Resources    [facility]: About COVID-19: [email or web address]    CDC: What to Do If You're Sick: www.cdc.gov/coronavirus/2019-ncov/about/steps-when-sick.html    CDC: Ending Home Isolation: www.cdc.gov/coronavirus/2019-ncov/hcp/disposition-in-home-patients.html     CDC: Caring for Someone: www.cdc.gov/coronavirus/2019-ncov/if-you-are-sick/care-for-someone.html     [facility]: Interim Guidance for Hospital Discharge to Home: www.health.[facility].[place]/diseases/coronavirus/hcp/hospdischarge.pdf    [facility] clinical trials (COVID-19 research studies): clinicalaffairs.[facility]/n-clinical-trials     Below are the COVID-19 hotlines at the [facility] ([facility]). Interpreters are available.   o For health questions: Call 116-367-4110 or 1-178.888.7442 (7 a.m. to 7 p.m.)  o For questions about schools and childcare: Call 612-110-1121 or 1-622.793.9276 (7 a.m. to 7 p.m.)      Kate Mancilla RN, FNA    Reason  for Disposition    Chest pain or pressure    Additional Information    Negative: SEVERE difficulty breathing (e.g., struggling for each breath, speaks in single words)    Negative: Difficult to awaken or acting confused (e.g., disoriented, slurred speech)    Negative: Bluish (or gray) lips or face now    Negative: Shock suspected (e.g., cold/pale/clammy skin, too weak to stand, low BP, rapid pulse)    Negative: Sounds like a life-threatening emergency to the triager    Negative: SEVERE or constant chest pain or pressure (Exception: mild central chest pain, present only when coughing)    Negative: MODERATE difficulty breathing (e.g., speaks in phrases, SOB even at rest, pulse 100-120)    Negative: Patient sounds very sick or weak to the triager    Negative: MILD difficulty breathing (e.g., minimal/no SOB at rest, SOB with walking, pulse <100)    Protocols used: CORONAVIRUS (COVID-19) DIAGNOSED OR [id number] 8.4.20

## 2021-06-12 NOTE — PROGRESS NOTES
Clinic Care Coordination Contact  Community Health Worker Initial Outreach         Patient accepts CC: No, I have no needs or barriers. I'm going to say in my room for the next 10 days while my family stays downstairs. I've had several calls today and feel I have a great understanding of my discharge instructions.

## 2021-06-12 NOTE — PROGRESS NOTES
"[facility]: : Integrated Behavioral Health  Evaluator Name:  Vanessa Laws     Credentials:  MSW, PRESTONSW    PATIENT'S NAME: Oli Gage  PREFERRED NAME: Oli  PREFERRED PRONOUNS:  he/him/his/himself     MRN:   [id number]  :   1985   ACCT. NUMBER: [id number]  DATE OF SERVICE: 2020  START TIME: 3:02 PM  END TIME: 3:58 PM  PREFERRED PHONE: [phone]  May we leave a program related message: Yes    STANDARD ADULT PSYCHOTHERAPY DIAGNOSTIC ASSESSMENT    Telemedicine Visit: The patient's condition can be safely assessed and treated via synchronous audio and visual telemedicine encounter.      Reason for Telemedicine Visit: Services only offered telehealth    Originating Site (Patient Location): Patient's home    Distant Site (Provider Location): Provider Remote Setting- Home Office    Consent:  The patient/guardian has verbally consented to: the potential risks and benefits of telemedicine (video visit) versus in person care; bill my insurance or make self-payment for services provided; and responsibility for payment of non-covered services.     Mode of Communication:  Video Conference via nanoMR    As the provider I attest to compliance with applicable laws and regulations related to telemedicine.    Identifying Information:  Patient is a 35 y.o., .  The pronoun use throughout this assessment reflects the patient's chosen pronoun.  Patient was referred for an assessment by Jordy Mckinnon MD.  Patient attended the session alone.     Chief Complaint:   The reason for seeking services at this time is: \" I have been feeling absent for the last few years. There is something not right in how I feel.\" The patient reports that he has been feeling more anxious recently and reports that he notices that he \"is not as helpful\" and he used to be and states: \"I just want to sit.\" The patient also reports that his father passed away in 2019 and reports " "that he \"hasn't been able to grieve.\" The patient reports that he has been feeling more anxious lately and reports that his \"sleep has been terrible\". The patient states: \"I'm kind of a night owl and have no motivation to get out of bed in the morning.\" The problem(s) began a few years ago but have increased in intensity. Patient has not attempted to resolve these concerns in the past.    Does the client have any condition that is currently presenting as a potential to harm themselves or others (severe withdrawal, serious medical condition, severe emotional/behavioral problem)? No.  Proceed with assessment.    Social/Family History:  Patient reported [place] [facility] [place].  They were raised by biological parents.   Parents remained  until his father's death..   Patient reported that  his   childhood was: \"both positive and negative\".  The patient reports that his mother struggled with alcohol abuse and had: agoraphobia, depression and anxiety, which negatively impacted the patient. The patient reports that his brother also struggled with depression and anxiety. The patient reports that he was \"the stable one\". Patient described their current relationships with his family of origin as fairly positive. The patient reports that his mother lives 2 1/2 blocks away and reports that he sees his mother often.       The patient describes their cultural background as: \"I come from a small family\". The patient states that his extended family consisted of \"13-15 people at the most\". The patient reports that he was raised Christianity. .  Cultural influences and impact on patient's life structure, values, norms, and healthcare: Racial or Ethnic Self-Identification , Social Orientation: The patient reports that he \"feels pressure to be the rock\" in his family that \"holds it all together\". 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 " ", Locus of Control: Internal and Spiritual Beliefs: \"Spiritual\" - \"I believe in a higher power\".  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 The patient reports that he is a \"night owl and has never been a morning person\". , Family Factors Patient is  with 3 children ages 8, 5, and 2. The patient reports that his wife recently lost her job and reports that he is currently the sole financial provider for his family., Community Factors : COVID-19 is a public health crisis that is impacting the patient's community.. and Economic Factors The patient reports that he worries about his family's fiancances.    These factors will be addressed in the Preliminary Treatment plan.  Patient identified their preferred language to be English. Patient reported they does not need the assistance of an  or other support involved in therapy.     Patient reported had no significant delays in developmental tasks.   Patient's highest education level was college graduate. Patient identified the following learning problems: none reported.  Modifications will not be used to assist communication in therapy.  Patient reports they are  able to understand written materials.    Patient reported the following relationship history:  Patient's current relationship status is  for 11 years.   Patient identified their sexual orientation as heterosexual.  Patient reported having three child(olga).     Patient's current living/housing situation involves staying in own home/apartment.  They live with their: wife and 3 children and they report that housing is stable. Patient identified mother, friends and co-worker as part of their support system.  Patient identified the quality of these relationships as good.      Patient is currently employed full time and reports they are able to function appropriately at work..  " Patient reports their finances are obtained through employment .  Patient does identify finances as a current stressor.      Patient reported that they have not been involved with the legal system.     Patient's Strengths and Limitations:  Patient identified the following strengths or resources that will help them succeed in treatment: community involvement, angela / spirituality, friends / good social support, family support, insight, intelligence, strong social skills and work ethic. Things that may interfere with the patient's success in treatment include: family members with mental health and chemical dependency issues and Financial stress. .   _______________________________________________  Personal and Family Medical History:   Patient does report a family history of mental health concerns.  Patient reports family history includes Alcoholism in his mother; Anxiety disorder in his brother and mother; Asthma in his mother; Depression in his brother and mother; Hypertension in his brother; Myelodysplastic syndrome in his father; No Medical Problems in his daughter and son..    Patient reports the following mental health history: None. First time seeking mental health services.    Patient has had a physical exam to rule out medical causes for current symptoms.  Date of last physical exam was within the past year. Client was encouraged to follow up with PCP if symptoms were to develop.. The patient has a [facility] Provider, who is named Jordy Mckinnon MD..  Patient reports no current medical concerns.  There are significant appetite / nutritional concerns / weight changes.   Patient does not report a history of head injury / trauma / cognitive impairment.      Patient reports current meds as:   Current Outpatient Medications   Medication Sig Dispense Refill     escitalopram oxalate (LEXAPRO) 10 MG tablet Take 1.5 tablets (15 mg total) by mouth daily. 135 tablet 3     No current facility-administered  medications for this visit.        Medication Adherence:  Patient reports taking prescribed medications as prescribed.    Patient Allergies:    Allergies   Allergen Reactions     Dilantin Infatabs [Phenytoin] Rash       Medical History:  No past medical history on file.      Current Mental Status Exam:   Appearance:  Appropriate    Eye Contact:  Good   Psychomotor:  Normal       Gait / station:  no problem  Attitude / Demeanor: Cooperative  Friendly Pleasant Attentive  Speech      Rate / Production: Normal/ Responsive      Volume:  Normal  volume      Language:  English is primary language and no obvious problems  Mood:   Anxious  Depressed  Normal  Affect:   Appropriate    Thought Content: Clear   Thought Process: Coherent  Logical       Associations: No loosening of associations  Insight:   Good  and Intellectual Insight  Judgment:  Intact   Orientation:  Person Place Time Situation All  Attention/concentration: Good    Rating Scales:    PHQ9:    PHQ-9 SCORE 7/1/2020 7/28/2020   PHQ-9 Total Score 17 6   ;    GAD7:    LEONARD-7 Total Score 7/1/2020   LEONARD-7 Total Score 15     CGI:     First:No data recorded;    Most recent:No data recorded    Substance Use:  Patient did report a family history of substance use concerns; see medical history section for details.  Patient has not received chemical dependency treatment in the past.  Patient has not ever been to detox.      Patient is not currently receiving any chemical dependency treatment. Patient reported the following problems as a result of their substance use: none identified.    Patient denies using alcohol.  Patient denies using tobacco.  Patient denies using marijuana.  Patient reports using caffeine 4 times per week and drinks 1 at a time.   Patient reports using/abusing the following substance(s). Patient reported no other substance use.     CAGE- AID:  No flowsheet data found.    Substance Use: No symptoms    Based on the negative CAGE score and clinical  interview there  are not indications of drug or alcohol abuse..      Significant Losses / Trauma / Abuse / Neglect Issues:   Patient did not serve in the .  There are indications or report of significant loss, trauma, abuse or neglect issues related to: death of father in August of 2019.  Concerns for possible neglect are not present.     Safety Assessment:   Current Safety Concerns:  [place] Suicide Severity Rating Scale (Lifetime/Recent)  [place] Suicide Severity Rating (Lifetime/Recent) 7/9/2020   1. Wish to be Dead (Lifetime) No   1. Wish to be Dead (Past Month) No   2. Non-Specific Active Suicidal Thoughts (Lifetime) No   2. Non-Specific Active Suicidal Thoughts (Past Month) No   3. Active Suicidal Ideation with any Methods (Not Plan) Without Intent to Act (Lifetime) No   3. Active Sucidal Ideation with any Methods (Not Plan) Without Intent to Act (Past Month) No   4. Active Suicidal Ideation with Some Intent to Act, Without Specific Plan (Lifetime) No   4. Active Suicidal Ideation with Some Intent to Act, Without Specific Plan (Past Month) No   5. Active Suicidal Ideation with Specific Plan and Intent (Lifetime) No   5. Active Suicidal Ideation with Specific Plan and Intent (Past Month) No   Actual Attempt (Lifetime) No   Actual Attempt (Past 3 Months) No   Has subject engaged in non-suicidal self-injurious behavior? (Lifetime) No   Has subject engaged in non-suicidal self-injurious behavior? (Past 3 Months) No   Interrupted Attempts (Lifetime) No   Interrupted Attempts (Past 3 Months) No   Aborted or Self-Interrupted Attempt (Lifetime) No   Aborted or Self-Interrupted Attempt (Past 3 Months) No   Preparatory Acts or Behavior (Lifetime) No   Preparatory Acts or Behavior (Past 3 Months) No     Patient denies current homicidal ideation and behaviors.  Patient denies current self-injurious ideation and behaviors.    Patient denied risk behaviors associated with substance use.  Patient denies any high  risk behaviors associated with mental health symptoms.  Patient reports the following current concerns for their personal safety: None.  Patient reports there are firearms in the house. The firearms are secured in a locked space.     History of Safety Concerns:  Patient denied a history of homicidal ideation.    Patient denied a history of personal safety concerns.    Patient denied a history of assaultive behaviors.   Patient denied a history of assaultive behaviors.     Patient denied a history of risk behaviors associated with substance use.  Patient denies any history of high risk behaviors associated with mental health symptoms.  Patient reports the following protective factors: spirituality, positive relationships positive social network and positive family connections, dedication to family/friends, safe and stable environment, abstinence from substances, adherence with prescribed medication, living with other people, daily obligations, structured day, effective problem-solving skills, positive social skills, healthy fear of risky behaviors or pain, sense of personal control or determination and access to a variety of clinical interventions    Risk Plan:  See Preliminary Treatment Plan for Safety and Risk Management Plan    Review of Symptoms per patient report:  Depression: Change in sleep, Lack of interest, Excessive or inappropriate guilt, Change in energy level, Difficulties concentrating, Change in appetite, Ruminations, Feeling sad, down, or depressed and Withdrawn  Dinah:  No Symptoms  Psychosis: No Symptoms  Anxiety: Excessive worry, Nervousness, Sleep disturbance, Ruminations, Poor concentration and Irritability  Panic:  No symptoms  Post Traumatic Stress Disorder:  No Symptoms   Eating Disorder: No Symptoms  ADD / ADHD:  No symptoms  Conduct Disorder: No symptoms  Autism Spectrum Disorder: No symptoms  Obsessive Compulsive Disorder: No Symptoms    Patient reports the following compulsive behaviors  "and treatment history: None.      Diagnostic Criteria:   ADJUSTMENT DISORDERS DSM5 CRITERIA: A. The development of emotional or behavioral symptoms in response to an identifiable stressor(s) occurring within 3 months of the onset of the stressor(s)  B. These symptoms or behaviors are clinically significant, as evidenced by one or both of the following:       - Marked distress that is out of proportion to the severity/intensity of the stressor (with consideration for external context & culture)       - Significant impairment in social, occupational, or other important areas of functioning  C. The stress-related disturbance does not meet criteria for another disorder & is not not an exacerbation of another mental disorder  D. The symptoms do not represent normal bereavement  E. Once the stressor or its consequences have terminated, the symptoms do not persist for more than an additional 6 months       * Adjustment Disorder with Mixed Anxiety and Depressed Mood: The predominant manifestation is a combination of depression and anxiety    Functional Status:  Patient reports the following functional impairments: childcare / parenting, home life with wife and children, management of the household and or completion of tasks and relationship(s).     WHODAS:   WHODAS 7/20/2020   Total Score 16       Clinical Summary:   1. Reason for assessment: \" I have been feeling absent for the last few years. There is something not right in how I feel.\" The patient reports that he has been feeling more anxious recently and reports that he notices that he \"is not as helpful\" and he used to be and states: \"I just want to sit.\" The patient also reports that his father passed away in August of 2019 and reports that he \"hasn't been able to grieve.\" The patient reports that he has been feeling more anxious lately and reports that his \"sleep has been terrible\". The patient states: \"I'm kind of a night owl and have no motivation to get out of bed " "in the morning.\"    .  2. Psychosocial, Cultural and Contextual Factors: Patient is a 35 year old, , heterosexual male. The patient is  and has 3 children. Social Orientation: The patient reports that he \"feels pressure to be the rock\" in his family that \"holds it all together\". 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 , Locus of Control: Internal and Spiritual Beliefs: \"Spiritual\" - \"I believe in a higher power\". 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 The patient reports that he is a \"night owl and has never been a morning person\". , Family Factors Patient is  with 3 children ages 8, 5, and 2. The patient reports that his wife recently lost her job and reports that he is currently the sole financial provider for his family., Community Factors : COVID-19 is a public health crisis that is impacting the patient's community.. and Economic Factors The patient reports that he worries about his family's finances.  3. As evidenced by self report and criteria, client meets the following DSM5 Diagnoses:   (Sustained by DSM5 Criteria Listed Above)  Adjustment Disorders  309.28 (F43.23) With mixed anxiety and depressed mood.  Other Diagnoses that is relevant to services: None.  4. R/O: 296.22 (F32.1)  Major Depressive Disorder, Single Episode, Moderate   300.02 (F41.1) Generalized Anxiety Disorder due to reported timing and duration of symptoms. Patient's symptoms appear to be in response to an identifiable stressor(s).  5. Provisional Diagnosis:  Adjustment Disorders  309.28 (F43.23) With mixed anxiety and depressed mood as evidenced by: reported symptoms .  6. Prognosis: Return to Normal Functioning, Expect Improvement and Relieve Acute Symptoms.  7. Likely consequences of symptoms if not treated: Patient's symptoms could worsen and require more intensive " treatment/interventions.  8. Client strengths include:  caring, educated, empathetic, employed, insightful, intelligent, responsible parent, support of family, friends and providers, supportive and work history .     Recommendations:     1. Plan for Safety and Risk Management:Recommended that patient call 911 or go to the local ED should there be a change in any of these risk factors..  Report to child / adult protection services was NA.     2. Patient's identified Mental health concerns with a cultural influence will be addressed by providing patient with client-centered care..     3. Initial Treatment will focus on: Anxiety and Depressed Mood - Establish therapeutic relationship with patient. Improve patient's ability to manage his thoughts and emotions. Discuss sleep hygiene.     4. Resources/Service Plan:       services are not indicated.     Modifications to assist communication are not indicated.     Additional disability accommodations are not indicated.      5. Collaboration:  Collaboration / coordination of treatment will be initiated with the following support professionals: primary care physician.      6.  Referrals:  The following referral(s) will be initiated: No referrals at this time.    A Release of Information has been obtained for the following: N/A.    7. ELEUTERIO: ELEUTERIO:  Discussed denies using alcohol.. Provider gave patient printed information about the effects of chemical use on their health and well being. Recommendations:  Do not increase consumption of alcohol .     8. Records were not reviewed at time of assessment. Patient does not have a previous history of mental health.  Information in this assessment was obtained from the medical record and provided by patient who is a good historian.   Patient will have open access to their mental health medical record..      Eval type:  Mental Health    Staff Name/Credentials:  Vanessa Laws, [facility]    7/20/2020

## 2021-06-12 NOTE — TELEPHONE ENCOUNTER
"Coronavirus (COVID-19) Notification    Caller Name (Patient, parent, daughter/sone, grandparent, etc)  Patient     Reason for call  Notify of Positive Coronavirus (COVID-19) lab results, assess symptoms,  review [facility] recommendations    Lab Result    Lab test:  2019-nCoV rRt-PCR or SARS-CoV-2 PCR    Oropharyngeal AND/OR nasopharyngeal swabs is POSITIVE for 2019-nCoV RNA/SARS-COV-2 PCR (COVID-19 virus)    RN Recommendations/Instructions per [facility] Coronavirus COVID-19 recommendations    Brief introduction script  Introduce self and then review script:  \"I am calling on behalf of Michael Bieker.  We were notified that your Coronavirus test (COVID-19) for was POSITIVE for the virus.  I have some information to relay to you but first I wanted to mention that the [place] Dept of Health will be contacting you shortly [it's possible MD already called Patient] to talk to you more about how you are feeling and other people you have had contact with who might now also have the virus.  Also, [facility] is Partnering with the [facility] for Covid-19 research, you may be contacted directly by research staff.\"    ssessment (Inquire about Patient's current symptoms)   Assessment   Current Symptoms at time of phone call: (if no symptoms, document No symptoms]  tired   Symptom onset (if applicable)  10/10/20     If at time of call, Patients symptoms hare worsened, the Patient should contact 911 or have someone drive them to Emergency Dept promptly:      If Patient calling 911, inform 911 personal that you have tested positive for the Coronavirus (COVID-19).  Place mask on and await 911 to arrive.    If Emergency Dept, If possible, please have another adult drive you to the Emergency Dept but you need to wear mask when in contact with other people.      Review information with Patient    Your result was positive. This means you have COVID-19 (coronavirus).  We have sent you a letter that reviews the " information that I'll be reviewing with you now.    How can I protect others?    If you have symptoms: stay home and away from others (self-isolate) until:    You've had no fever--and no medicine that reduces fever--for 1 full day (24 hours). And      Your other symptoms have gotten better. For example, your cough or breathing has improved. And     At least 10 days have passed since your symptoms started. (If you ve been told by a doctor that you have a weak immune system, wait 20 days.)     If you don't have symptoms: Stay home and away from others (self-isolate) until at least 10 days have passed since your first positive COVID-19 test. (Date test collected).    During this time:    Stay in your own room, including for meals. Use your own bathroom if you can.    Stay away from others in your home. No hugging, kissing or shaking hands. No visitors.     Don't go to work, school or anywhere else.     Clean  high touch  surfaces often (doorknobs, counters, handles, etc.). Use a household cleaning spray or wipes. You'll find a full list on the EPA website at www.epa.gov/pesticide-registration/list-n-disinfectants-use-against-sars-cov-2.     Cover your mouth and nose with a mask, tissue or other face covering to avoid spreading germs.    Wash your hands and face often with soap and water.    Caregivers in these groups are at risk for severe illness due to COVID-19:  o People 65 years and older  o People who live in a nursing home or long-term care facility  o People with chronic disease (lung, heart, cancer, diabetes, kidney, liver, immunologic)  o People who have a weakened immune system, including those who:  - Are in cancer treatment  - Take medicine that weakens the immune system, such as corticosteroids  - Had a bone marrow or organ transplant  - Have an immune deficiency  - Have poorly controlled HIV or AIDS  - Are obese (body mass index of 40 or higher)  - Smoke regularly    Caregivers should wear gloves while  washing dishes, handling laundry and cleaning bedrooms and bathrooms.    Wash and dry laundry with special caution. Don't shake dirty laundry, and use the warmest water setting you can.    If you have a weakened immune system, ask your doctor about other actions you should take.    For more tips, go to www.cdc.gov/coronavirus/2019-ncov/downloads/10Things.pdf.    You should not go back to work until you meet the guidelines above for ending your home isolation. You don't need to be retested for COVID-19 before going back to work--studies show that you won't spread the virus if it's been at least 10 days since your symptoms started (or 20 days, if you have a weak immune system).    Employers: This document serves as formal notice of your employee's medical guidelines for going back to work. They must meet the above guidelines before going back to work in person.    How can I take care of myself?    1. Get lots of rest. Drink extra fluids (unless a doctor has told you not to).    2. Take Tylenol (acetaminophen) for fever or pain. If you have liver or kidney problems, ask your family doctor if it's okay to take Tylenol.     Take either:     650 mg (two 325 mg pills) every 4 to 6 hours, or     1,000 mg (two 500 mg pills) every 8 hours as needed.     Note: Don't take more than 3,000 mg in one day. Acetaminophen is found in many medicines (both prescribed and over-the-counter medicines). Read all labels to be sure you don't take too much.    For children, check the Tylenol bottle for the right dose (based on their age or weight).    3. If you have other health problems (like cancer, heart failure, an organ transplant or severe kidney disease): Call your specialty clinic if you don't feel better in the next 2 days.    4. Know when to call 911: Emergency warning signs include:    Trouble breathing or shortness of breath    Pain or pressure in the chest that doesn't go away    Feeling confused like you haven't felt before, or  not being able to wake up    Bluish-colored lips or face    5. Sign up for [employer]. We know it's scary to hear that you have COVID-19. We want to track your symptoms to make sure you're okay over the next 2 weeks. Please look for an email from [employer]--this is a free, online program that we'll use to keep in touch. To sign up, follow the link in the email. Learn more at [email or web address].    Where can I get more information?    [facility]: [email or web address]    Coronavirus Basics: www.health.[email or web address].[place]./diseases/coronavirus/basics.html    What to Do If You're Sick: www.cdc.gov/coronavirus/2019-ncov/about/steps-when-sick.html    Ending Home Isolation: www.cdc.gov/coronavirus/2019-ncov/hcp/disposition-in-home-patients.html     Caring for Someone with COVID-19: www.cdc.gov/coronavirus/2019-ncov/if-you-are-sick/care-for-someone.html     [facility] clinical trials (COVID-19 research studies): [email or web address].[email or web address]-clinical-trials     A Positive COVID-19 letter will be sent via [employer] or the Mail.  (Exception, no letters sent to Presurgerical/Preprocedure Patients)    [Name]  Marilee Davila RN  [facility]  COVID19 Results Team RN  Ph# [phone]

## 2021-06-15 NOTE — PROGRESS NOTES
ASSESSMENT/PLAN:       1. 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 Major depressive disorder with single episode, in full remission (H)  PHQ-9 score is now 2  Patient has decided not to get an influenza vaccine this year    6 months follow-up preventative care visit and will need to do lipids at that time  Level of medical decision making low  2 or more chronic illnesses were addressed  Testing that includes the PHQ 9 and yas 7 were interpreted  Prescription drug management for the patient's chronic illnesses was reviewed and continued with Escitalopram.          The following are part of a depression follow up plan for the patient:  under care of mental health counselor, seen by mental health counselor and mental health screening assessment    Jordy Mckinnon MD      PROGRESS NOTE   2021    SUBJECTIVE:  Oli Gage is a 35 y.o. male  who presents for   Chief Complaint   Patient presents with     Follow-up     feeling better.      The patient is here for a 6-month follow-up of his anxiety and depression.  Patient currently is on Escitalopram 15 mg daily and is doing much better.  PHQ-9 score initially was 17 then down to 6 and now 2  YAS-7 score was 15 then down to 4 and still 4  Further improvement when going from 10 mg to 15 mg and no significant side effects  The patient also has been getting counseling individually as well as as couples at [facility] which has been very helpful  Is noticed improvement in energy  Recently has moved into his parents house and his mother is living just 2 blocks away.  Father is .  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      Current Outpatient Medications   Medication Sig Dispense Refill     escitalopram oxalate (LEXAPRO) 10 MG tablet Take 1.5 tablets (15 mg total) by mouth daily. 135 tablet 3     No current facility-administered medications for this visit.        Social History     Tobacco Use   Smoking Status Never Smoker   Smokeless Tobacco Never Used           OBJECTIVE:        No results found for this or any previous visit (from the past 240 hour(s)).    Vitals:    02/04/21 1557   BP: 118/82   Pulse: 90   SpO2: 98%   Weight: (!) 250 lb 1.6 oz (113.4 kg)     Weight: (!) 250 lb 1.6 oz (113.4 kg)          Physical Exam:  GENERAL APPEARANCE: Very pleasant 35-year-old male, NAD, well hydrated, well nourished  SKIN:  Normal skin turgor, no lesions/rashes   HEENT: moist mucous membranes, no rhinorrhea  NECK: Normal without adenopathy or masses  Mental status exam: Patient is casually dressed and well-groomed with normal eye contact.  Speech is normal with good range of affect and no psychomotor agitation.  Patient has a thought process that is goal-directed with no delusional thinking or paranoia.  No suicidal ideations.  EXTREMITY: no edema and full ROM of all joints  NEURO: no focal findings

## 2021-08-15 ENCOUNTER — HEALTH MAINTENANCE LETTER (OUTPATIENT)
Age: 36
End: 2021-08-15

## 2021-10-11 ENCOUNTER — HEALTH MAINTENANCE LETTER (OUTPATIENT)
Age: 36
End: 2021-10-11

## 2022-03-24 DIAGNOSIS — F41.1 GENERALIZED ANXIETY DISORDER: ICD-10-CM

## 2022-03-24 DIAGNOSIS — F32.1 MAJOR DEPRESSIVE DISORDER, SINGLE EPISODE, MODERATE (H): ICD-10-CM

## 2022-03-25 RX ORDER — ESCITALOPRAM OXALATE 10 MG/1
TABLET ORAL
Qty: 135 TABLET | Refills: 1 | Status: SHIPPED | OUTPATIENT
Start: 2022-03-25 | End: 2022-10-03

## 2022-03-25 NOTE — TELEPHONE ENCOUNTER
"Routing refill request to provider for review/approval because:  PHQ 9 score - not on file/out of date.  Patient needs to be seen because it has been more than 1 year since last office visit.    Last Written Prescription Date:  9/24/21  Last Fill Quantity: 135,  # refills: 1   Last office visit provider:  2/4/21     Requested Prescriptions   Pending Prescriptions Disp Refills     escitalopram (LEXAPRO) 10 MG tablet [Pharmacy Med Name: ESCITALOPRAM 10 MG TABLET] 135 tablet 1     Sig: TAKE 1 AND 1/2 TABLETS DAILY BY MOUTH       SSRIs Protocol Failed - 3/25/2022  9:47 AM        Failed - PHQ-9 score less than 5 in past 6 months     Please review last PHQ-9 score.           Passed - Medication is active on med list        Passed - Patient is age 18 or older        Passed - Recent (6 mo) or future (30 days) visit within the authorizing provider's specialty     Patient had office visit in the last 6 months or has a visit in the next 30 days with authorizing provider or within the authorizing provider's specialty.  See \"Patient Info\" tab in inbasket, or \"Choose Columns\" in Meds & Orders section of the refill encounter.                 Jered Pruitt RN 03/25/22 9:48 AM  "

## 2022-04-21 ENCOUNTER — OFFICE VISIT (OUTPATIENT)
Dept: FAMILY MEDICINE | Facility: CLINIC | Age: 37
End: 2022-04-21
Payer: COMMERCIAL

## 2022-04-21 VITALS
DIASTOLIC BLOOD PRESSURE: 88 MMHG | OXYGEN SATURATION: 96 % | BODY MASS INDEX: 35.47 KG/M2 | SYSTOLIC BLOOD PRESSURE: 134 MMHG | WEIGHT: 267.6 LBS | HEIGHT: 73 IN | HEART RATE: 86 BPM

## 2022-04-21 DIAGNOSIS — F32.5 MAJOR DEPRESSION IN COMPLETE REMISSION (H): ICD-10-CM

## 2022-04-21 DIAGNOSIS — F41.1 GENERALIZED ANXIETY DISORDER: ICD-10-CM

## 2022-04-21 DIAGNOSIS — L43.9 LICHEN PLANUS: Primary | ICD-10-CM

## 2022-04-21 PROCEDURE — 99213 OFFICE O/P EST LOW 20 MIN: CPT | Performed by: FAMILY MEDICINE

## 2022-04-21 RX ORDER — DESOXIMETASONE 2.5 MG/G
CREAM TOPICAL 2 TIMES DAILY
Qty: 60 G | Refills: 1 | Status: SHIPPED | OUTPATIENT
Start: 2022-04-21 | End: 2023-01-10

## 2022-04-23 PROBLEM — L43.9 LICHEN PLANUS: Status: ACTIVE | Noted: 2022-04-23

## 2022-04-23 PROBLEM — F32.5 MAJOR DEPRESSION IN COMPLETE REMISSION (H): Status: ACTIVE | Noted: 2022-04-23

## 2022-04-23 PROBLEM — F41.1 GENERALIZED ANXIETY DISORDER: Status: ACTIVE | Noted: 2022-04-23

## 2022-04-23 ASSESSMENT — ANXIETY QUESTIONNAIRES
IF YOU CHECKED OFF ANY PROBLEMS ON THIS QUESTIONNAIRE, HOW DIFFICULT HAVE THESE PROBLEMS MADE IT FOR YOU TO DO YOUR WORK, TAKE CARE OF THINGS AT HOME, OR GET ALONG WITH OTHER PEOPLE: NOT DIFFICULT AT ALL
5. BEING SO RESTLESS THAT IT IS HARD TO SIT STILL: NOT AT ALL
GAD7 TOTAL SCORE: 3
7. FEELING AFRAID AS IF SOMETHING AWFUL MIGHT HAPPEN: SEVERAL DAYS
1. FEELING NERVOUS, ANXIOUS, OR ON EDGE: SEVERAL DAYS
6. BECOMING EASILY ANNOYED OR IRRITABLE: SEVERAL DAYS
2. NOT BEING ABLE TO STOP OR CONTROL WORRYING: NOT AT ALL
3. WORRYING TOO MUCH ABOUT DIFFERENT THINGS: NOT AT ALL

## 2022-04-23 ASSESSMENT — PATIENT HEALTH QUESTIONNAIRE - PHQ9
5. POOR APPETITE OR OVEREATING: NOT AT ALL
SUM OF ALL RESPONSES TO PHQ QUESTIONS 1-9: 1

## 2022-04-24 ASSESSMENT — ANXIETY QUESTIONNAIRES: GAD7 TOTAL SCORE: 3

## 2022-04-24 NOTE — PROGRESS NOTES
ASSESSMENT/PLAN:       1. Lichen planus  Would like to use a relatively strong steroid up to 2 weeks twice a day and then could use a once a day for another 2 weeks and then take a break for at least a couple weeks.  If after 2 to 4 weeks this is not helping we will need to use something different.  Could consider betamethasone cream or ointment.    - desoximetasone (TOPICORT) 0.25 % external cream; Apply topically 2 times daily  Dispense: 60 g; Refill: 1    2. Major depression in complete remission (H)  The patient will continue with escitalopram 15 mg daily    3. Generalized anxiety disorder  The patient will continue with escitalopram 15 mg daily        Health Maintenance Due   Topic     Hepatitis B Vaccine (3 of 3 - 3-dose primary series)     Flu Vaccine (1)     Total time same days encounter 26 minutes  This included previsit time reviewing care everywhere and media for dermatology notes.  Also time reviewing his PHQ-9 and LEONARD-7 and face-to-face time discussing his treatment plan and follow-up    Follow-up 6 to 12 months for preventive healthcare visit with new provider because of my longterm.    Jordy Mckinnon MD      PROGRESS NOTE   4/23/2022    SUBJECTIVE:  [id number]  who presents for   Chief Complaint   Patient presents with     Rash     Rash that was in his armpit is now gone. Still has the rash on Right ankle has had a diagnosis previously but wants it rechecked.      The patient initially made the appointment because he had a rash.his right armpit that lasted about 3 weeks.  The rash was burning and somewhat itchy.  It currently is gone.      However he has had a rash on his right medial ankle that has been intermittently present for a number of years and at one time was diagnosed as lichen planus.  He had a cream that he used which seemed to take care.  But he is run out of that actually more than a year ago.  He does not know what the cream was called and he thinks that a prescription originally was  given a number of years ago.  He was seen by a dermatologist at that time.    The patient continues to take escitalopram 15 mg daily which has been managing his depression and anxiety very nicely.  He wishes to continue on this medication.  The patient's depression is in remission    I asked about doing some blood work such as his lipids and routine screening for hepatitis C and HIV and we will do that at a later time with his preventive healthcare visit    Patient Active Problem List   Diagnosis     Lichen planus     Major depression in complete remission (H)     Generalized anxiety disorder       Current Outpatient Medications   Medication Sig Dispense Refill     desoximetasone (TOPICORT) 0.25 % external cream Apply topically 2 times daily 60 g 1     escitalopram (LEXAPRO) 10 MG tablet TAKE 1 AND 1/2 TABLETS DAILY BY MOUTH 135 tablet 1       History   Smoking Status     Never Smoker   Smokeless Tobacco     Never Used     ROS:  Answers for HPI/ROS submitted by the patient on 4/18/2022  How many servings of fruits and vegetables do you eat daily?: 0-1  On average, how many sweetened beverages do you drink each day (Examples: soda, juice, sweet tea, etc.  Do NOT count diet or artificially sweetened beverages)?: 1  How many minutes a day do you exercise enough to make your heart beat faster?: 9 or less  How many days a week do you exercise enough to make your heart beat faster?: 3 or less  How many days per week do you miss taking your medication?: 0  What is the reason for your visit today?: Rash on ankle. Guessing lichen planus, but want to check. I also had a rash on my side, no longer there, but was curious about it.  When did your symptoms begin?: More than a month  What are your symptoms?: Itchy rash on ankle.  How would you describe these symptoms?: Mild  Are your symptoms:: Staying the same  Have you had these symptoms before?: Yes  Have you tried or received treatment for these symptoms before?: Yes  Did that  "treatment work? : Yes  Please describe the treatment you had:: Cream medication  Is there anything that makes you feel worse?: No.  Is there anything that makes you feel better?: Scratching and hit shower          OBJECTIVE:          Vitals:    04/21/22 1548   BP: 134/88   BP Location: Right arm   Patient Position: Sitting   Cuff Size: Adult Regular   Pulse: 86   SpO2: 96%   Weight: 121.4 kg (267 lb 9.6 oz)   Height: 1.842 m (6' 0.5\")     Wt Readings from Last 3 Encounters:   04/21/22 121.4 kg (267 lb 9.6 oz)   02/04/21 113.4 kg (250 lb 1.6 oz)   09/11/20 112.9 kg (249 lb)           Physical Exam:  GENERAL APPEARANCE: Very pleasant 36-year-old male, NAD, well hydrated, well nourished  SKIN:  Normal skin turgor, right medial ankle there is a cluster of slightly raised pink plaques that measure 5 to 8 mm in diameter.  No drainage or surrounding erythema.  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  PHQ-9 score is 1 for trouble with sleep  EXTREMITY: no edema and full ROM of all joints  NEURO: no focal findings    "

## 2022-09-24 ENCOUNTER — HEALTH MAINTENANCE LETTER (OUTPATIENT)
Age: 37
End: 2022-09-24

## 2022-12-09 ENCOUNTER — OFFICE VISIT (OUTPATIENT)
Dept: FAMILY MEDICINE | Facility: CLINIC | Age: 37
End: 2022-12-09
Payer: COMMERCIAL

## 2022-12-09 ENCOUNTER — E-VISIT (OUTPATIENT)
Dept: URGENT CARE | Facility: URGENT CARE | Age: 37
End: 2022-12-09
Payer: COMMERCIAL

## 2022-12-09 VITALS
HEART RATE: 118 BPM | SYSTOLIC BLOOD PRESSURE: 128 MMHG | DIASTOLIC BLOOD PRESSURE: 87 MMHG | BODY MASS INDEX: 34.78 KG/M2 | RESPIRATION RATE: 14 BRPM | WEIGHT: 260 LBS | TEMPERATURE: 99.9 F | OXYGEN SATURATION: 97 %

## 2022-12-09 DIAGNOSIS — J02.0 STREPTOCOCCAL PHARYNGITIS: Primary | ICD-10-CM

## 2022-12-09 DIAGNOSIS — Z53.9 DIAGNOSIS NOT YET DEFINED: Primary | ICD-10-CM

## 2022-12-09 DIAGNOSIS — J02.9 SORE THROAT: ICD-10-CM

## 2022-12-09 LAB — DEPRECATED S PYO AG THROAT QL EIA: POSITIVE

## 2022-12-09 PROCEDURE — 99213 OFFICE O/P EST LOW 20 MIN: CPT | Performed by: FAMILY MEDICINE

## 2022-12-09 PROCEDURE — 87880 STREP A ASSAY W/OPTIC: CPT | Performed by: FAMILY MEDICINE

## 2022-12-09 NOTE — PATIENT INSTRUCTIONS
Dear Oli Gage,    We are sorry you are not feeling well. Based on the responses you provided, it is recommended that you be seen in-person in urgent care so we can better evaluate your symptoms. Please click here to find the nearest urgent care location to you.   You will not be charged for this Visit. Thank you for trusting us with your care.    Irish Jennings PA-C

## 2022-12-09 NOTE — PATIENT INSTRUCTIONS
Amoxicillin 500mg three times a day for 10 days  Change your toothbrush in 2-3 days    Contagious for 24 hours

## 2022-12-10 NOTE — PROGRESS NOTES
Assessment/Plan:   Sore throat  Streptococcal pharyngitis  Significant household exposure to strep throat this week.  Onset of sore throat yesterday with increased headache chills fatigue body aches and pain with swallowing today.  Throat is consistent with tonsillitis.  Rapid strep test is positive for strep.  We will treat with amoxicillin and he can take 500 mg 3 times a day for 10 days.  He has sufficient quantity at home to accomplish this.  Change toothbrush in 2 to 3 days.  Recheck if worse or no better.  - Streptococcus A Rapid Screen w/Reflex to PCR - Clinic Collect    I discussed red flag symptoms, return precautions to clinic/ER and follow up care with patient/parent.  Expected clinical course, symptomatic cares advised. Questions answered. Patient/parent amenable with plan.    Amoxicillin 500mg three times a day for 10 days - has at home  Change your toothbrush in 2-3 days    Contagious for 24 hours    Subjective:     Oli Gage is a 37 year old male who presents for evaluation of sore throat.  Onset of pain yesterday.  Today he has had increased chills headache and more severe pain with swallowing.  Occasional cough to clear the throat but no chest symptoms.  No runny nose.  No rash.  2 of his 3 kids in the household have had strep this week.  Due to the antibiotic shortage they have ended up with multiple options of antibiotics in their home.  Specifically the capsules of amoxicillin 500 mg which they were supposed to open and dispensed for their kids to eat and a spoon of pudding or something.  That did not go over well and they have since been started on an oral since suspension of some other antibiotic.  He has 38 capsules of 500 mg amoxicillin at home.    Allergies   Allergen Reactions     Phenytoin Rash     Current Outpatient Medications   Medication     escitalopram (LEXAPRO) 10 MG tablet     desoximetasone (TOPICORT) 0.25 % external cream     No current facility-administered  medications for this visit.     Patient Active Problem List   Diagnosis     Lichen planus     Major depression in complete remission (H)     Generalized anxiety disorder       Objective:     /87   Pulse 118   Temp 99.9  F (37.7  C) (Oral)   Resp 14   Wt 117.9 kg (260 lb)   SpO2 97%   BMI 34.78 kg/m      Physical    General Appearance: Alert, pleasant, no distress mildly ill-appearing.  Slightly muffled voice.  Afebrile vital signs stable  Head: Normocephalic, without obvious abnormality, atraumatic  Eyes: Conjunctivae are normal.   Ears: Normal TMs and external ear canals, both ears  Nose: No significant congestion.  Throat: Throat is red and beefy, no exudate.  No vesicular lesions  Neck: Tender bilateral anterior cervical adenopathy  Lungs: Clear to auscultation bilaterally, respirations unlabored  Heart: Regular rate and rhythm  Skin: no rashes or lesions  Psychiatric: Patient has a normal mood and affect.     Results for orders placed or performed in visit on 12/09/22   Streptococcus A Rapid Screen w/Reflex to PCR - Clinic Collect     Status: Abnormal    Specimen: Throat; Swab   Result Value Ref Range    Group A Strep antigen Positive (A) Negative       This note has been dictated in part using voice recognition software.  Any grammatical or context distortions are unintentional and inherent to the software.  Please feel free to contact me directly for clarification if needed.

## 2023-01-10 ENCOUNTER — OFFICE VISIT (OUTPATIENT)
Dept: FAMILY MEDICINE | Facility: CLINIC | Age: 38
End: 2023-01-10
Payer: COMMERCIAL

## 2023-01-10 VITALS
WEIGHT: 264 LBS | BODY MASS INDEX: 34.99 KG/M2 | RESPIRATION RATE: 16 BRPM | OXYGEN SATURATION: 98 % | DIASTOLIC BLOOD PRESSURE: 84 MMHG | HEIGHT: 73 IN | SYSTOLIC BLOOD PRESSURE: 126 MMHG | TEMPERATURE: 98.4 F | HEART RATE: 90 BPM

## 2023-01-10 DIAGNOSIS — L43.9 LICHEN PLANUS: ICD-10-CM

## 2023-01-10 DIAGNOSIS — Z00.00 ANNUAL PHYSICAL EXAM: Primary | ICD-10-CM

## 2023-01-10 DIAGNOSIS — F32.1 MAJOR DEPRESSIVE DISORDER, SINGLE EPISODE, MODERATE (H): ICD-10-CM

## 2023-01-10 DIAGNOSIS — Z80.0 FAMILY HISTORY OF COLON CANCER: ICD-10-CM

## 2023-01-10 DIAGNOSIS — Z13.89 ENCOUNTER FOR SURVEILLANCE OF ABNORMAL NEVI: ICD-10-CM

## 2023-01-10 DIAGNOSIS — F41.1 GENERALIZED ANXIETY DISORDER: ICD-10-CM

## 2023-01-10 DIAGNOSIS — Z13.1 SCREENING FOR DIABETES MELLITUS: ICD-10-CM

## 2023-01-10 PROBLEM — E88.810 METABOLIC SYNDROME: Status: ACTIVE | Noted: 2023-01-10

## 2023-01-10 LAB
ERYTHROCYTE [DISTWIDTH] IN BLOOD BY AUTOMATED COUNT: 12.6 % (ref 10–15)
HBA1C MFR BLD: 5.4 % (ref 0–5.6)
HCT VFR BLD AUTO: 51.1 % (ref 40–53)
HGB BLD-MCNC: 17.3 G/DL (ref 13.3–17.7)
MCH RBC QN AUTO: 28.7 PG (ref 26.5–33)
MCHC RBC AUTO-ENTMCNC: 33.9 G/DL (ref 31.5–36.5)
MCV RBC AUTO: 85 FL (ref 78–100)
PLATELET # BLD AUTO: 193 10E3/UL (ref 150–450)
RBC # BLD AUTO: 6.03 10E6/UL (ref 4.4–5.9)
WBC # BLD AUTO: 5.6 10E3/UL (ref 4–11)

## 2023-01-10 PROCEDURE — 83036 HEMOGLOBIN GLYCOSYLATED A1C: CPT | Performed by: PHYSICIAN ASSISTANT

## 2023-01-10 PROCEDURE — 36415 COLL VENOUS BLD VENIPUNCTURE: CPT | Performed by: PHYSICIAN ASSISTANT

## 2023-01-10 PROCEDURE — 80061 LIPID PANEL: CPT | Performed by: PHYSICIAN ASSISTANT

## 2023-01-10 PROCEDURE — 99395 PREV VISIT EST AGE 18-39: CPT | Performed by: PHYSICIAN ASSISTANT

## 2023-01-10 PROCEDURE — 80053 COMPREHEN METABOLIC PANEL: CPT | Performed by: PHYSICIAN ASSISTANT

## 2023-01-10 PROCEDURE — 85027 COMPLETE CBC AUTOMATED: CPT | Performed by: PHYSICIAN ASSISTANT

## 2023-01-10 RX ORDER — DESOXIMETASONE 2.5 MG/G
CREAM TOPICAL 2 TIMES DAILY
Qty: 60 G | Refills: 1 | Status: SHIPPED | OUTPATIENT
Start: 2023-01-10 | End: 2024-02-14

## 2023-01-10 RX ORDER — ESCITALOPRAM OXALATE 10 MG/1
TABLET ORAL
Qty: 90 TABLET | Refills: 3 | Status: SHIPPED | OUTPATIENT
Start: 2023-01-10 | End: 2024-02-06

## 2023-01-10 ASSESSMENT — PATIENT HEALTH QUESTIONNAIRE - PHQ9
SUM OF ALL RESPONSES TO PHQ QUESTIONS 1-9: 3
10. IF YOU CHECKED OFF ANY PROBLEMS, HOW DIFFICULT HAVE THESE PROBLEMS MADE IT FOR YOU TO DO YOUR WORK, TAKE CARE OF THINGS AT HOME, OR GET ALONG WITH OTHER PEOPLE: NOT DIFFICULT AT ALL
SUM OF ALL RESPONSES TO PHQ QUESTIONS 1-9: 3

## 2023-01-10 ASSESSMENT — ENCOUNTER SYMPTOMS
DIARRHEA: 0
ARTHRALGIAS: 0
HEMATOCHEZIA: 0
CHILLS: 0
NERVOUS/ANXIOUS: 0
PALPITATIONS: 0
PARESTHESIAS: 0
FREQUENCY: 0
MYALGIAS: 0
DIZZINESS: 0
HEADACHES: 0
NAUSEA: 0
ABDOMINAL PAIN: 0
HEARTBURN: 0
HEMATURIA: 0
SORE THROAT: 0
WEAKNESS: 0
DYSURIA: 0
COUGH: 0
FEVER: 0
JOINT SWELLING: 0
CONSTIPATION: 0
SHORTNESS OF BREATH: 0
EYE PAIN: 0

## 2023-01-10 NOTE — PROGRESS NOTES
SUBJECTIVE:   CC: Oli is an 37 year old who presents for preventative health visit.     Patient has been advised of split billing requirements and indicates understanding: Yes  Oli is a pleasant 37-year-old male that presents the clinic today for annual physical.  Past medical history significant for anxiety, depression, and lichen planus.  He is feeling well today has no questions or concerns regarding his chronic health.  He is currently on Lexapro for his mood.  He feels that his mood is doing quite well.  He would like to decrease his dose from 15 mg to 10 mg and hopefully eventually wean off of this.  He has been on it for about a year and he feels that his mood is stable.  He had a lot of life stressors when he started this due to the passing of his father.    He was diagnosed with lichen planus few years ago he is using a topical steroid cream which helps.  He has the rash on his ankles and arms.  He has not been using his topical steroid because he was told by another provider that he can use this only short-term.    There is also colon cancer in his family.  He did have a colonoscopy at 24 due to this and they recommended 10-year follow-up.    Also has a few moles on his stomach and back which are getting larger and darker in color.  Few moles on his stomach and back which are getting larger and darker in color.    Healthy Habits:     Getting at least 3 servings of Calcium per day:  NO    Bi-annual eye exam:  Yes    Dental care twice a year:  Yes    Sleep apnea or symptoms of sleep apnea:  Daytime drowsiness, Excessive snoring and Sleep apnea    Diet:  Regular (no restrictions)    Frequency of exercise:  None    Taking medications regularly:  Yes    Medication side effects:  Not applicable    PHQ-2 Total Score: 0    Additional concerns today:  Yes      Today's PHQ-2 Score:   PHQ-2 ( 1999 [employer]) 1/10/2023   Q1: Little interest or pleasure in doing things 0   Q2: Feeling down, depressed or hopeless 0    PHQ-2 Score 0   Q1: Little interest or pleasure in doing things Not at all   Q2: Feeling down, depressed or hopeless Not at all   PHQ-2 Score 0       Have you ever done Advance Care Planning? (For example, a Health Directive, POLST, or a discussion with a medical provider or your loved ones about your wishes): No, advance care planning information given to patient to review.  Advanced care planning was discussed at today's visit.    Social History     Tobacco Use     Smoking status: Never     Passive exposure: Never     Smokeless tobacco: Never   Substance Use Topics     Alcohol use: Not Currently     If you drink alcohol do you typically have >3 drinks per day or >7 drinks per week? No    Alcohol Use 1/10/2023   Prescreen: >3 drinks/day or >7 drinks/week? Not Applicable   Prescreen: >3 drinks/day or >7 drinks/week? -       Last PSA: No results found for: PSA    Reviewed orders with patient. Reviewed health maintenance and updated orders accordingly - Yes      Reviewed and updated as needed this visit by clinical staff   Tobacco  Allergies  Meds              Reviewed and updated as needed this visit by Provider                 No past medical history on file.   No past surgical history on file.    Review of Systems   Constitutional: Negative for chills and fever.   HENT: Positive for congestion. Negative for ear pain, hearing loss and sore throat.    Eyes: Negative for pain and visual disturbance.   Respiratory: Negative for cough and shortness of breath.    Cardiovascular: Negative for chest pain, palpitations and peripheral edema.   Gastrointestinal: Negative for abdominal pain, constipation, diarrhea, heartburn, hematochezia and nausea.   Genitourinary: Negative for dysuria, frequency, genital sores, hematuria, impotence, penile discharge and urgency.   Musculoskeletal: Negative for arthralgias, joint swelling and myalgias.   Skin: Negative for rash.   Neurological: Negative for dizziness, weakness,  "headaches and paresthesias.   Psychiatric/Behavioral: Negative for mood changes. The patient is not nervous/anxious.        OBJECTIVE:   /84 (BP Location: Left arm, Patient Position: Sitting, Cuff Size: Adult Regular)   Pulse 90   Temp 98.4  F (36.9  C) (Oral)   Resp 16   Ht 1.85 m (6' 0.84\")   Wt 119.7 kg (264 lb)   SpO2 98%   BMI 34.99 kg/m      Physical Exam  Vitals and nursing note reviewed.   Constitutional:       Appearance: Normal appearance.   HENT:      Head: Normocephalic and atraumatic.      Right Ear: Tympanic membrane, ear canal and external ear normal.      Left Ear: Tympanic membrane, ear canal and external ear normal.      Mouth/Throat:      Mouth: Mucous membranes are moist.   Eyes:      General:         Right eye: No discharge.         Left eye: No discharge.      Conjunctiva/sclera: Conjunctivae normal.   Cardiovascular:      Rate and Rhythm: Normal rate and regular rhythm.      Heart sounds: No murmur heard.    No friction rub. No gallop.   Pulmonary:      Effort: Pulmonary effort is normal.      Breath sounds: No wheezing, rhonchi or rales.   Abdominal:      General: Bowel sounds are normal.      Tenderness: There is no abdominal tenderness. There is no guarding or rebound.   Skin:     General: Skin is warm and dry.      Findings: Lesion present.      Comments: Multiple nevi to abdomen and back.  Nothing looks too concerning today.  He does note that they are getting larger and changing color.   Neurological:      General: No focal deficit present.      Mental Status: He is alert.      Motor: No weakness.   Psychiatric:         Mood and Affect: Mood normal.         Behavior: Behavior normal.         Thought Content: Thought content normal.         Judgment: Judgment normal.         ASSESSMENT/PLAN:       ICD-10-CM    1. Annual physical exam  Z00.00 CBC with platelets     Comprehensive metabolic panel (BMP + Alb, Alk Phos, ALT, AST, Total. Bili, TP)     Lipid panel reflex to direct " LDL Fasting     CBC with platelets     Comprehensive metabolic panel (BMP + Alb, Alk Phos, ALT, AST, Total. Bili, TP)     Lipid panel reflex to direct LDL Fasting      2. Lichen planus  L43.9 desoximetasone (TOPICORT) 0.25 % external cream      3. Generalized anxiety disorder  F41.1 escitalopram (LEXAPRO) 10 MG tablet      4. Major depressive disorder, single episode, moderate (H)  F32.1 escitalopram (LEXAPRO) 10 MG tablet      5. Encounter for surveillance of abnormal nevi  Z13.89 Adult Dermatology Referral      6. Family history of colon cancer  Z80.0 Adult GI  Referral - Procedure Only      7. Screening for diabetes mellitus  Z13.1 Hemoglobin A1c     Hemoglobin A1c        #1 annual physical  We will update screening labs including CBC, complete metabolic panel, lipid and an A1c.    #2 anxiety/depression  Currently on Lexapro 15 mg daily.  He would like to wean down to 10 mg and I think this is reasonable.  If his mood gets worse he should go back up to the 15 mg.  He is otherwise doing well and feels that his mood is stable at this time.    #3 lichen planus  Is to continue topical steroids.  I did recommend using this for 7 days on and 7 days off.  Do not apply the steroid cream to his face.    #4 hyperlipidemia screening  We will check a lipid panel today.    #5 screening for diabetes  We will check an A1c    #6 abnormal nevi  We will have him see dermatology for a skin check.  Referral has been placed    #7 colon cancer screening/family history of colon cancer  We will set him up for a colonoscopy with his family history of colon cancer.  His mother was diagnosed with colon cancer in her early 50s.  He did have a colonoscopy at 24 which was normal and recommended a 10-year follow-up due to family history of colon cancer        Patient has been advised of split billing requirements and indicates understanding: Yes      COUNSELING:   Reviewed preventive health counseling, as reflected in patient  "instructions       Regular exercise       Healthy diet/nutrition       Vision screening       Colorectal cancer screening      BMI:   Estimated body mass index is 34.99 kg/m  as calculated from the following:    Height as of this encounter: 1.85 m (6' 0.84\").    Weight as of this encounter: 119.7 kg (264 lb).   Weight management plan: Discussed healthy diet and exercise guidelines      He reports that he has never smoked. He has never been exposed to tobacco smoke. He has never used smokeless tobacco.            Bennett Cody PA-C  [facility]  Answers for HPI/ROS submitted by the patient on 1/10/2023  If you checked off any problems, how difficult have these problems made it for you to do your work, take care of things at home, or get along with other people?: Not difficult at all  PHQ9 TOTAL SCORE: 3      Answers for HPI/ROS submitted by the patient on 1/10/2023  If you checked off any problems, how difficult have these problems made it for you to do your work, take care of things at home, or get along with other people?: Not difficult at all  PHQ9 TOTAL SCORE: 3      "

## 2023-01-11 LAB
ALBUMIN SERPL BCG-MCNC: 4.6 G/DL (ref 3.5–5.2)
ALP SERPL-CCNC: 80 U/L (ref 40–129)
ALT SERPL W P-5'-P-CCNC: 91 U/L (ref 10–50)
ANION GAP SERPL CALCULATED.3IONS-SCNC: 16 MMOL/L (ref 7–15)
AST SERPL W P-5'-P-CCNC: 39 U/L (ref 10–50)
BILIRUB SERPL-MCNC: 0.3 MG/DL
BUN SERPL-MCNC: 13.1 MG/DL (ref 6–20)
CALCIUM SERPL-MCNC: 10.2 MG/DL (ref 8.6–10)
CHLORIDE SERPL-SCNC: 102 MMOL/L (ref 98–107)
CHOLEST SERPL-MCNC: 227 MG/DL
CREAT SERPL-MCNC: 0.99 MG/DL (ref 0.67–1.17)
DEPRECATED HCO3 PLAS-SCNC: 22 MMOL/L (ref 22–29)
GFR SERPL CREATININE-BSD FRML MDRD: >90 ML/MIN/1.73M2
GLUCOSE SERPL-MCNC: 102 MG/DL (ref 70–99)
HDLC SERPL-MCNC: 29 MG/DL
LDLC SERPL CALC-MCNC: 120 MG/DL
NONHDLC SERPL-MCNC: 198 MG/DL
POTASSIUM SERPL-SCNC: 4.3 MMOL/L (ref 3.4–5.3)
PROT SERPL-MCNC: 7.2 G/DL (ref 6.4–8.3)
SODIUM SERPL-SCNC: 140 MMOL/L (ref 136–145)
TRIGL SERPL-MCNC: 389 MG/DL

## 2023-01-31 DIAGNOSIS — F41.1 GENERALIZED ANXIETY DISORDER: ICD-10-CM

## 2023-01-31 DIAGNOSIS — F32.1 MAJOR DEPRESSIVE DISORDER, SINGLE EPISODE, MODERATE (H): ICD-10-CM

## 2023-02-01 RX ORDER — ESCITALOPRAM OXALATE 10 MG/1
TABLET ORAL
Qty: 45 TABLET | OUTPATIENT
Start: 2023-02-01

## 2023-02-01 NOTE — TELEPHONE ENCOUNTER
"Routing refill request to provider for review/approval because:  Refill requested is different dose than active medication in EMR    Last office visit provider:  01/10/2023           Requested Prescriptions   Pending Prescriptions Disp Refills     escitalopram (LEXAPRO) 10 MG tablet [Pharmacy Med Name: ESCITALOPRAM 10 MG TABLET] 45 tablet      Sig: TAKE 1 AND 1/2 TABLETS DAILY BY MOUTH       SSRIs Protocol Passed - 2/1/2023 12:40 PM        Passed - PHQ-9 score less than 5 in past 6 months     Please review last PHQ-9 score.           Passed - Medication is active on med list        Passed - Patient is age 18 or older        Passed - Recent (6 mo) or future (30 days) visit within the authorizing provider's specialty     Patient had office visit in the last 6 months or has a visit in the next 30 days with authorizing provider or within the authorizing provider's specialty.  See \"Patient Info\" tab in inbasket, or \"Choose Columns\" in Meds & Orders section of the refill encounter.                 Althea Quinonez RN 02/01/23 12:42 PM     Requested Prescriptions   Pending Prescriptions Disp Refills     escitalopram (LEXAPRO) 10 MG tablet [Pharmacy Med Name: ESCITALOPRAM 10 MG TABLET] 45 tablet      Sig: TAKE 1 AND 1/2 TABLETS DAILY BY MOUTH       SSRIs Protocol Passed - 2/1/2023 12:40 PM        Passed - PHQ-9 score less than 5 in past 6 months     Please review last PHQ-9 score.           Passed - Medication is active on med list        Passed - Patient is age 18 or older        Passed - Recent (6 mo) or future (30 days) visit within the authorizing provider's specialty     Patient had office visit in the last 6 months or has a visit in the next 30 days with authorizing provider or within the authorizing provider's specialty.  See \"Patient Info\" tab in inbasket, or \"Choose Columns\" in Meds & Orders section of the refill encounter.                 Althea Quinonez RN 02/01/23 12:40 PM  "

## 2023-02-01 NOTE — TELEPHONE ENCOUNTER
"Pt has physical exam at 1/10/2023 with Bennett. \" He would like to wean down to 10 mg and I think this is reasonable.  If his mood gets worse he should go back up to the 15 mg.\"    Pina Bryan MD on 2/1/2023 at 1:15 PM;  "

## 2023-03-11 ENCOUNTER — OFFICE VISIT (OUTPATIENT)
Dept: FAMILY MEDICINE | Facility: CLINIC | Age: 38
End: 2023-03-11
Payer: COMMERCIAL

## 2023-03-11 VITALS
HEART RATE: 116 BPM | DIASTOLIC BLOOD PRESSURE: 85 MMHG | BODY MASS INDEX: 35.02 KG/M2 | OXYGEN SATURATION: 97 % | SYSTOLIC BLOOD PRESSURE: 133 MMHG | RESPIRATION RATE: 18 BRPM | WEIGHT: 264.2 LBS | TEMPERATURE: 98.6 F

## 2023-03-11 DIAGNOSIS — J02.0 STREP THROAT: Primary | ICD-10-CM

## 2023-03-11 DIAGNOSIS — R07.0 THROAT PAIN: ICD-10-CM

## 2023-03-11 LAB — DEPRECATED S PYO AG THROAT QL EIA: POSITIVE

## 2023-03-11 PROCEDURE — 87880 STREP A ASSAY W/OPTIC: CPT | Performed by: FAMILY MEDICINE

## 2023-03-11 PROCEDURE — 99213 OFFICE O/P EST LOW 20 MIN: CPT | Performed by: FAMILY MEDICINE

## 2023-03-11 RX ORDER — AMOXICILLIN 500 MG/1
1000 CAPSULE ORAL DAILY
Qty: 20 CAPSULE | Refills: 0 | Status: SHIPPED | OUTPATIENT
Start: 2023-03-11 | End: 2023-03-21

## 2023-03-11 NOTE — PROGRESS NOTES
Assessment & Plan     Throat pain  - Streptococcus A Rapid Screen w/Reflex to PCR - Clinic Collect    Strep throat  - amoxicillin (AMOXIL) 500 MG capsule  Dispense: 20 capsule; Refill: 0     1 g of amoxicillin daily for 10 days was prescribed.  Recommended at their annual visit for the kids coming up soon to have them screened for strep as one of the may be a carrier. See AVS summary for additional recommendations reviewed with patient during this visit.         Hudson Bedoya MD   [facility]    Katie Baird is a 37 year old male who presents to clinic today for the following health issues:  Chief Complaint   Patient presents with     Pharyngitis     Sore throat for 2 days     HPI    Last couple months family get strep other than the middle child there is concerns that he may be a carrier.  He has not had a fever but has a sore throat and feeling a little bit rundown.  No new respiratory symptoms present.  No reports of difficulty swallowing or opening the mouth.    Patient Active Problem List    Diagnosis Date Noted     Metabolic syndrome 01/10/2023     Priority: Medium     Lichen planus 04/23/2022     Priority: Medium     Major depression in complete remission (H) 04/23/2022     Priority: Medium     Generalized anxiety disorder 04/23/2022     Priority: Medium     Allergic rhinitis due to pollen 01/21/2013     Priority: Medium     Chronic sinusitis 01/21/2013     Priority: Medium       Current Outpatient Medications   Medication     amoxicillin (AMOXIL) 500 MG capsule     escitalopram (LEXAPRO) 10 MG tablet     desoximetasone (TOPICORT) 0.25 % external cream     No current facility-administered medications for this visit.           Objective    /85   Pulse 116   Temp 98.6  F (37  C) (Oral)   Resp 18   Wt 119.8 kg (264 lb 3.2 oz)   SpO2 97%   BMI 35.02 kg/m    Physical Exam     GEN: NAD    Results for orders placed or performed in visit on 03/11/23   Streptococcus A Rapid Screen  w/Reflex to PCR - Clinic Collect     Status: Abnormal    Specimen: Throat; Swab   Result Value Ref Range    Group A Strep antigen Positive (A) Negative                     The use of Dragon/[employer] dictation services may have been used to construct the content in this note; any grammatical or spelling errors are non-intentional. Please contact the author of this note directly if you are in need of any clarification.

## 2023-03-11 NOTE — PATIENT INSTRUCTIONS
Antibiotic: Amoxicillin once a day for 10 days     Throw out toothbrush and replace in 4-5 days     Symptoms may not be significantly better for a day or so     Acetaminophen and/or ibuprofen ( if allowed to take)  as needed for fever/discomfort every 4-6 hours as needed        Stay hydrated: approximately 100 ounces a day

## 2023-09-08 ENCOUNTER — MYC MEDICAL ADVICE (OUTPATIENT)
Dept: FAMILY MEDICINE | Facility: CLINIC | Age: 38
End: 2023-09-08
Payer: COMMERCIAL

## 2023-09-08 NOTE — TELEPHONE ENCOUNTER
"Pt was seen 1/2023 for annual check up. It was mentioned in the note about his ecessive snoring and sleep apnea.     Per note:       \"Sleep apnea or symptoms of sleep apnea:  Daytime drowsiness, Excessive snoring and Sleep apnea    Diet:  Regular (no restrictions)\"    Liz Murphy MA on 9/8/2023 at 9:32 AM    "

## 2024-02-06 DIAGNOSIS — F32.1 MAJOR DEPRESSIVE DISORDER, SINGLE EPISODE, MODERATE (H): ICD-10-CM

## 2024-02-06 DIAGNOSIS — F41.1 GENERALIZED ANXIETY DISORDER: ICD-10-CM

## 2024-02-06 RX ORDER — ESCITALOPRAM OXALATE 10 MG/1
TABLET ORAL
Qty: 30 TABLET | Refills: 0 | Status: SHIPPED | OUTPATIENT
Start: 2024-02-06 | End: 2024-02-14

## 2024-02-09 SDOH — HEALTH STABILITY: PHYSICAL HEALTH: ON AVERAGE, HOW MANY DAYS PER WEEK DO YOU ENGAGE IN MODERATE TO STRENUOUS EXERCISE (LIKE A BRISK WALK)?: 0 DAYS

## 2024-02-09 SDOH — HEALTH STABILITY: PHYSICAL HEALTH: ON AVERAGE, HOW MANY MINUTES DO YOU ENGAGE IN EXERCISE AT THIS LEVEL?: 0 MIN

## 2024-02-09 ASSESSMENT — SOCIAL DETERMINANTS OF HEALTH (SDOH): HOW OFTEN DO YOU GET TOGETHER WITH FRIENDS OR RELATIVES?: ONCE A WEEK

## 2024-02-14 ENCOUNTER — OFFICE VISIT (OUTPATIENT)
Dept: FAMILY MEDICINE | Facility: CLINIC | Age: 39
End: 2024-02-14
Payer: COMMERCIAL

## 2024-02-14 VITALS
BODY MASS INDEX: 33.93 KG/M2 | RESPIRATION RATE: 14 BRPM | OXYGEN SATURATION: 98 % | DIASTOLIC BLOOD PRESSURE: 70 MMHG | HEIGHT: 73 IN | WEIGHT: 256 LBS | HEART RATE: 85 BPM | SYSTOLIC BLOOD PRESSURE: 108 MMHG | TEMPERATURE: 98.4 F

## 2024-02-14 DIAGNOSIS — F41.1 GENERALIZED ANXIETY DISORDER: ICD-10-CM

## 2024-02-14 DIAGNOSIS — R06.83 SNORING: ICD-10-CM

## 2024-02-14 DIAGNOSIS — Z80.0 FAMILY HISTORY OF COLON CANCER: ICD-10-CM

## 2024-02-14 DIAGNOSIS — F33.42 RECURRENT MAJOR DEPRESSIVE DISORDER, IN FULL REMISSION (H): ICD-10-CM

## 2024-02-14 DIAGNOSIS — Z00.00 ANNUAL PHYSICAL EXAM: Primary | ICD-10-CM

## 2024-02-14 DIAGNOSIS — E78.5 HYPERLIPIDEMIA LDL GOAL <100: ICD-10-CM

## 2024-02-14 DIAGNOSIS — L43.9 LICHEN PLANUS: ICD-10-CM

## 2024-02-14 DIAGNOSIS — F32.1 MAJOR DEPRESSIVE DISORDER, SINGLE EPISODE, MODERATE (H): ICD-10-CM

## 2024-02-14 LAB
ERYTHROCYTE [DISTWIDTH] IN BLOOD BY AUTOMATED COUNT: 12.4 % (ref 10–15)
HBA1C MFR BLD: 5.3 % (ref 0–5.6)
HCT VFR BLD AUTO: 50 % (ref 40–53)
HGB BLD-MCNC: 17.3 G/DL (ref 13.3–17.7)
MCH RBC QN AUTO: 28.8 PG (ref 26.5–33)
MCHC RBC AUTO-ENTMCNC: 34.6 G/DL (ref 31.5–36.5)
MCV RBC AUTO: 83 FL (ref 78–100)
PLATELET # BLD AUTO: 226 10E3/UL (ref 150–450)
RBC # BLD AUTO: 6 10E6/UL (ref 4.4–5.9)
WBC # BLD AUTO: 6.2 10E3/UL (ref 4–11)

## 2024-02-14 PROCEDURE — 90471 IMMUNIZATION ADMIN: CPT | Performed by: PHYSICIAN ASSISTANT

## 2024-02-14 PROCEDURE — 99214 OFFICE O/P EST MOD 30 MIN: CPT | Mod: 25 | Performed by: PHYSICIAN ASSISTANT

## 2024-02-14 PROCEDURE — 90686 IIV4 VACC NO PRSV 0.5 ML IM: CPT | Performed by: PHYSICIAN ASSISTANT

## 2024-02-14 PROCEDURE — 99395 PREV VISIT EST AGE 18-39: CPT | Mod: 25 | Performed by: PHYSICIAN ASSISTANT

## 2024-02-14 PROCEDURE — 80053 COMPREHEN METABOLIC PANEL: CPT | Performed by: PHYSICIAN ASSISTANT

## 2024-02-14 PROCEDURE — 80061 LIPID PANEL: CPT | Performed by: PHYSICIAN ASSISTANT

## 2024-02-14 PROCEDURE — 83036 HEMOGLOBIN GLYCOSYLATED A1C: CPT | Performed by: PHYSICIAN ASSISTANT

## 2024-02-14 PROCEDURE — 36415 COLL VENOUS BLD VENIPUNCTURE: CPT | Performed by: PHYSICIAN ASSISTANT

## 2024-02-14 PROCEDURE — 85027 COMPLETE CBC AUTOMATED: CPT | Performed by: PHYSICIAN ASSISTANT

## 2024-02-14 RX ORDER — ESCITALOPRAM OXALATE 10 MG/1
TABLET ORAL
Qty: 90 TABLET | Refills: 3 | Status: SHIPPED | OUTPATIENT
Start: 2024-02-14

## 2024-02-14 RX ORDER — DESOXIMETASONE 2.5 MG/G
CREAM TOPICAL 2 TIMES DAILY
Start: 2024-02-14

## 2024-02-14 ASSESSMENT — ENCOUNTER SYMPTOMS
CHILLS: 0
DYSPHORIC MOOD: 0
PALPITATIONS: 0
NERVOUS/ANXIOUS: 1
CONFUSION: 0
HALLUCINATIONS: 0
COLOR CHANGE: 0
SLEEP DISTURBANCE: 1
DECREASED CONCENTRATION: 0
SEIZURES: 0
VOMITING: 0
EYE PAIN: 0
SORE THROAT: 0
SHORTNESS OF BREATH: 0
FEVER: 0
COUGH: 0
ARTHRALGIAS: 0
HEMATURIA: 0
ABDOMINAL PAIN: 0
BACK PAIN: 0
DYSURIA: 0

## 2024-02-14 ASSESSMENT — PATIENT HEALTH QUESTIONNAIRE - PHQ9
SUM OF ALL RESPONSES TO PHQ QUESTIONS 1-9: 3
SUM OF ALL RESPONSES TO PHQ QUESTIONS 1-9: 3
10. IF YOU CHECKED OFF ANY PROBLEMS, HOW DIFFICULT HAVE THESE PROBLEMS MADE IT FOR YOU TO DO YOUR WORK, TAKE CARE OF THINGS AT HOME, OR GET ALONG WITH OTHER PEOPLE: NOT DIFFICULT AT ALL

## 2024-02-14 NOTE — PROGRESS NOTES
Preventive Care Visit  [facility]  Bennett Cody PA-C, Family Medicine  Feb 14, 20243    Assessment & Plan     Annual physical exam  Will update labs including a CBC, complete metabolic panel, lipid and A1c.  Flu shot updated  - CBC with platelets; Future  - Comprehensive metabolic panel (BMP + Alb, Alk Phos, ALT, AST, Total. Bili, TP); Future  - Hemoglobin A1c; Future    Hyperlipidemia LDL goal <100  Last lipid panel did show an elevated LDL at 120, triglyceride 389, HDL of 29.  We did discuss diet and activity.  He is working on this.  He is down 10 pounds from last year.  Recommended starting daily fish oil.  Will check a fasting lipid panel today  - Lipid panel reflex to direct LDL Fasting; Future  Recent Labs   Lab Test 01/10/23  1635   CHOL 227*   HDL 29*   *   TRIG 389*      Recurrent major depressive disorder, in full remission (H24)  Generalized anxiety disorder  Mood is currently stable on Lexapro 10 mg daily.  No side effects of the medication.  - escitalopram (LEXAPRO) 10 MG tablet; TAKE 1 TABLET BY MOUTH EVERY DAY    Lichen planus  Utilizing cream as needed.  - desoximetasone (TOPICORT) 0.25 % external cream; Apply topically 2 times daily    Snoring  Snoring and daytime sleepiness.  Would like to do a sleep study.  Referral to sleep medicine placed  - Adult Sleep Eval & Management  Referral; Future    Family history of colon cancer  Mother was diagnosed with colon cancer at the age of 50.  He did have a colonoscopy at 24.  He is due for another colonoscopy.  Referral placed to follow-up with [facility].  - Colonoscopy      Follow-up Visit   Expected date:  Feb 14, 2025 (Approximate)      Follow Up Appointment Details:     Follow-up with whom?: Me    Follow-Up for what?: Adult Preventive    How?: In Person                           BMI  Estimated body mass index is 33.93 kg/m  as calculated from the following:    Height as of this encounter:  "1.85 m (6' 0.84\").    Weight as of this encounter: 116.1 kg (256 lb).   Weight management plan: Discussed healthy diet and exercise guidelines    Counseling  Appropriate preventive services were discussed with this patient, including applicable screening as appropriate for fall prevention, nutrition, physical activity, Tobacco-use cessation, weight loss and cognition.  Checklist reviewing preventive services available has been given to the patient.  Reviewed patient's diet, addressing concerns and/or questions.   He is at risk for psychosocial distress and has been provided with information to reduce risk.     Patient has been advised of split billing requirements and indicates understanding: Yes        Subjective   Oli is a 38 year old, presenting for the following:  Physical (Non fasting Physical)        2/14/2024     3:10 PM   Additional Questions   Roomed by Liz Murphy   Accompanied by none        Health Care Directive  Patient does not have a Health Care Directive or Living Will: Discussed advance care planning with patient; however, patient declined at this time.    Oli is a pleasant 38-year-old male who presents to the clinic today for annual physical.  Past medical history significant for anxiety, depression, lichen planus, hyperlipidemia, and family history of colon cancer.  He is currently on Lexapro 10 mg daily.  Mood is stable on the current medications.  He is trying to stay active and watch his diet.  He is down about 10 pounds from last year.    His mother was diagnosed with colon cancer in her early 50s.  He did have a colonoscopy at 24 and they recommended repeating 1 at 34.  He has not done so.  Referral was placed last year.  Will call to get this set up    He states that he has excessive snoring and daytime fatigue.  Daytime fatigue could be due to the 3 children that are waking him up at night.  He is not waking up with headaches.  He is interested in doing a sleep study          " 2/9/2024   General Health   How would you rate your overall physical health? Good   Feel stress (tense, anxious, or unable to sleep) Only a little   (!) STRESS CONCERN      2/9/2024   Nutrition   Three or more servings of calcium each day? (!) NO   Diet: Regular (no restrictions)    Breakfast skipped   How many servings of fruit and vegetables per day? (!) 0-1   How many sweetened beverages each day? 0-1         2/9/2024   Exercise   Days per week of moderate/strenous exercise 0 days   Average minutes spent exercising at this level 0 min   (!) EXERCISE CONCERN      2/9/2024   Social Factors   Frequency of gathering with friends or relatives Once a week   Worry food won't last until get money to buy more No   Food not last or not have enough money for food? No   Do you have housing?  Yes   Are you worried about losing your housing? No   Lack of transportation? No   Unable to get utilities (heat,electricity)? No         2/9/2024   Dental   Dentist two times every year? Yes         2/9/2024   TB Screening   Were you born outside of [place]?  No       Today's PHQ-9 Score:       2/14/2024     3:00 PM   PHQ-9 SCORE   PHQ-9 Total Score MyChart 3 (Minimal depression)   PHQ-9 Total Score 3         2/9/2024   Substance Use   Alcohol more than 3/day or more than 7/wk Not Applicable   Do you use any other substances recreationally? No     Social History     Tobacco Use    Smoking status: Never     Passive exposure: Never    Smokeless tobacco: Never   Vaping Use    Vaping Use: Never used   Substance Use Topics    Alcohol use: Not Currently    Drug use: Not Currently             2/9/2024   One time HIV Screening   Previous HIV test? No         2/9/2024   STI Screening   New sexual partner(s) since last STI/HIV test? No        Reviewed and updated as needed this visit by Provider                    No past medical history on file.  No past surgical history on file.    Review of Systems   Constitutional:  Negative for chills and fever.  "  HENT: Negative.  Negative for ear pain and sore throat.    Eyes:  Negative for pain and visual disturbance.   Respiratory:  Negative for cough and shortness of breath.    Cardiovascular:  Negative for chest pain and palpitations.   Gastrointestinal:  Negative for abdominal pain and vomiting.   Genitourinary:  Negative for dysuria and hematuria.   Musculoskeletal:  Negative for arthralgias and back pain.   Skin:  Negative for color change and rash.   Neurological:  Negative for seizures and syncope.   Psychiatric/Behavioral:  Positive for sleep disturbance. Negative for behavioral problems, confusion, decreased concentration, dysphoric mood, hallucinations, self-injury and suicidal ideas. The patient is nervous/anxious.    All other systems reviewed and are negative.        Objective    Exam  /70 (BP Location: Left arm, Patient Position: Sitting, Cuff Size: Adult Regular)   Pulse 85   Temp 98.4  F (36.9  C) (Oral)   Resp 14   Ht 1.85 m (6' 0.84\")   Wt 116.1 kg (256 lb)   SpO2 98%   BMI 33.93 kg/m     Estimated body mass index is 33.93 kg/m  as calculated from the following:    Height as of this encounter: 1.85 m (6' 0.84\").    Weight as of this encounter: 116.1 kg (256 lb).    Physical Exam  Vitals and nursing note reviewed.   Constitutional:       General: He is not in acute distress.     Appearance: Normal appearance. He is well-developed and well-groomed. He is not ill-appearing or toxic-appearing.   HENT:      Head: Normocephalic and atraumatic.      Right Ear: Tympanic membrane, ear canal and external ear normal.      Left Ear: Tympanic membrane, ear canal and external ear normal.      Mouth/Throat:      Lips: Pink.      Mouth: Mucous membranes are moist.      Palate: No mass.      Pharynx: Oropharynx is clear. Uvula midline.      Tonsils: No tonsillar exudate or tonsillar abscesses.   Eyes:      General: Lids are normal.         Right eye: No discharge.         Left eye: No discharge.   Neck:     "  Trachea: Trachea normal.   Cardiovascular:      Rate and Rhythm: Normal rate and regular rhythm.      Heart sounds: S1 normal and S2 normal. No murmur heard.  Pulmonary:      Effort: Pulmonary effort is normal.      Breath sounds: Normal breath sounds and air entry.   Abdominal:      General: Bowel sounds are normal. There is no distension.      Palpations: Abdomen is soft.      Tenderness: There is no abdominal tenderness. There is no guarding or rebound.   Musculoskeletal:      Cervical back: Full passive range of motion without pain and neck supple.      Right lower leg: No edema.      Left lower leg: No edema.   Lymphadenopathy:      Cervical: No cervical adenopathy.   Skin:     General: Skin is warm and dry.      Findings: No lesion or rash.   Neurological:      General: No focal deficit present.      Mental Status: He is alert.      Cranial Nerves: No cranial nerve deficit.      Gait: Gait is intact.      Deep Tendon Reflexes:      Reflex Scores:       Patellar reflexes are 2+ on the right side and 2+ on the left side.  Psychiatric:         Attention and Perception: Attention normal.         Mood and Affect: Mood normal.         Speech: Speech normal.           Signed Electronically by: Bennett Cody PA-C    Answers submitted by the patient for this visit:  Patient Health Questionnaire (Submitted on 2/14/2024)  If you checked off any problems, how difficult have these problems made it for you to do your work, take care of things at home, or get along with other people?: Not difficult at all  PHQ9 TOTAL SCORE: 3

## 2024-02-15 LAB
ALBUMIN SERPL BCG-MCNC: 4.6 G/DL (ref 3.5–5.2)
ALP SERPL-CCNC: 70 U/L (ref 40–150)
ALT SERPL W P-5'-P-CCNC: 62 U/L (ref 0–70)
ANION GAP SERPL CALCULATED.3IONS-SCNC: 12 MMOL/L (ref 7–15)
AST SERPL W P-5'-P-CCNC: 26 U/L (ref 0–45)
BILIRUB SERPL-MCNC: 0.5 MG/DL
BUN SERPL-MCNC: 11.7 MG/DL (ref 6–20)
CALCIUM SERPL-MCNC: 9.7 MG/DL (ref 8.6–10)
CHLORIDE SERPL-SCNC: 103 MMOL/L (ref 98–107)
CHOLEST SERPL-MCNC: 194 MG/DL
CREAT SERPL-MCNC: 1.07 MG/DL (ref 0.67–1.17)
DEPRECATED HCO3 PLAS-SCNC: 25 MMOL/L (ref 22–29)
EGFRCR SERPLBLD CKD-EPI 2021: >90 ML/MIN/1.73M2
FASTING STATUS PATIENT QL REPORTED: NO
GLUCOSE SERPL-MCNC: 92 MG/DL (ref 70–99)
HDLC SERPL-MCNC: 28 MG/DL
LDLC SERPL CALC-MCNC: 119 MG/DL
NONHDLC SERPL-MCNC: 166 MG/DL
POTASSIUM SERPL-SCNC: 4.3 MMOL/L (ref 3.4–5.3)
PROT SERPL-MCNC: 7.4 G/DL (ref 6.4–8.3)
SODIUM SERPL-SCNC: 140 MMOL/L (ref 135–145)
TRIGL SERPL-MCNC: 234 MG/DL

## 2024-03-15 ASSESSMENT — SLEEP AND FATIGUE QUESTIONNAIRES
HOW LIKELY ARE YOU TO NOD OFF OR FALL ASLEEP WHILE SITTING INACTIVE IN A PUBLIC PLACE: WOULD NEVER DOZE
HOW LIKELY ARE YOU TO NOD OFF OR FALL ASLEEP WHILE SITTING AND READING: SLIGHT CHANCE OF DOZING
HOW LIKELY ARE YOU TO NOD OFF OR FALL ASLEEP IN A CAR, WHILE STOPPED FOR A FEW MINUTES IN TRAFFIC: WOULD NEVER DOZE
HOW LIKELY ARE YOU TO NOD OFF OR FALL ASLEEP WHILE SITTING QUIETLY AFTER LUNCH WITHOUT ALCOHOL: WOULD NEVER DOZE
HOW LIKELY ARE YOU TO NOD OFF OR FALL ASLEEP WHILE WATCHING TV: SLIGHT CHANCE OF DOZING
HOW LIKELY ARE YOU TO NOD OFF OR FALL ASLEEP WHEN YOU ARE A PASSENGER IN A CAR FOR AN HOUR WITHOUT A BREAK: WOULD NEVER DOZE
HOW LIKELY ARE YOU TO NOD OFF OR FALL ASLEEP WHILE LYING DOWN TO REST IN THE AFTERNOON WHEN CIRCUMSTANCES PERMIT: MODERATE CHANCE OF DOZING
HOW LIKELY ARE YOU TO NOD OFF OR FALL ASLEEP WHILE SITTING AND TALKING TO SOMEONE: WOULD NEVER DOZE

## 2024-03-17 PROBLEM — F32.5 MAJOR DEPRESSION IN COMPLETE REMISSION (H): Chronic | Status: ACTIVE | Noted: 2022-04-23

## 2024-03-17 PROBLEM — F41.1 GENERALIZED ANXIETY DISORDER: Chronic | Status: ACTIVE | Noted: 2022-04-23

## 2024-03-18 ENCOUNTER — VIRTUAL VISIT (OUTPATIENT)
Dept: SLEEP MEDICINE | Facility: CLINIC | Age: 39
End: 2024-03-18
Attending: PHYSICIAN ASSISTANT
Payer: COMMERCIAL

## 2024-03-18 VITALS
DIASTOLIC BLOOD PRESSURE: 70 MMHG | HEIGHT: 73 IN | SYSTOLIC BLOOD PRESSURE: 108 MMHG | BODY MASS INDEX: 33.93 KG/M2 | WEIGHT: 256 LBS

## 2024-03-18 DIAGNOSIS — R06.83 SNORING: ICD-10-CM

## 2024-03-18 DIAGNOSIS — Z72.820 LACK OF ADEQUATE SLEEP: ICD-10-CM

## 2024-03-18 DIAGNOSIS — R53.83 MALAISE AND FATIGUE: ICD-10-CM

## 2024-03-18 DIAGNOSIS — E66.09 CLASS 1 OBESITY DUE TO EXCESS CALORIES WITH SERIOUS COMORBIDITY AND BODY MASS INDEX (BMI) OF 33.0 TO 33.9 IN ADULT: ICD-10-CM

## 2024-03-18 DIAGNOSIS — R53.81 MALAISE AND FATIGUE: ICD-10-CM

## 2024-03-18 DIAGNOSIS — G47.30 SLEEP APNEA, UNSPECIFIED TYPE: ICD-10-CM

## 2024-03-18 DIAGNOSIS — R06.00 DYSPNEA AND RESPIRATORY ABNORMALITY: Primary | ICD-10-CM

## 2024-03-18 DIAGNOSIS — R06.89 DYSPNEA AND RESPIRATORY ABNORMALITY: Primary | ICD-10-CM

## 2024-03-18 DIAGNOSIS — E66.811 CLASS 1 OBESITY DUE TO EXCESS CALORIES WITH SERIOUS COMORBIDITY AND BODY MASS INDEX (BMI) OF 33.0 TO 33.9 IN ADULT: ICD-10-CM

## 2024-03-18 PROBLEM — E88.810 METABOLIC SYNDROME: Chronic | Status: ACTIVE | Noted: 2023-01-10

## 2024-03-18 PROCEDURE — 99244 OFF/OP CNSLTJ NEW/EST MOD 40: CPT | Mod: 95 | Performed by: PHYSICIAN ASSISTANT

## 2024-03-18 ASSESSMENT — PAIN SCALES - GENERAL: PAINLEVEL: NO PAIN (0)

## 2024-03-18 NOTE — PATIENT INSTRUCTIONS
"          MY TREATMENT INFORMATION FOR SLEEP APNEA-  Oli Gage    DOCTOR : Yesika Jones PA    Am I having a sleep study at a sleep center?  --->Due to normal delays, you will be contacted within 2-4 weeks to schedule    Am I having a home sleep study?  --->Watch the video for the device you are using:    -/drop off device-   https://www.[facility].com/watch?v=yGGFBdELGhk    -Disposable device sent out require phone/computer application-   https://www.[employer].com/watch?v=BCce_vbiwxE      Frequently asked questions:  1. What is Obstructive Sleep Apnea (YANIRA)? YANIRA is the most common type of sleep apnea. Apnea means, \"without breath.\"  Apnea is most often caused by narrowing or collapse of the upper airway as muscles relax during sleep.   Almost everyone has occasional apneas. Most people with sleep apnea have had brief interruptions at night frequently for many years.  The severity of sleep apnea is related to how frequent and severe the events are.   2. What are the consequences of YANIRA? Symptoms include: feeling sleepy during the day, snoring loudly, gasping or stopping of breathing, trouble sleeping, and occasionally morning headaches or heartburn at night.  Sleepiness can be serious and even increase the risk of falling asleep while driving. Other health consequences may include development of high blood pressure and other cardiovascular disease in persons who are susceptible. Untreated YANIRA  can contribute to heart disease, stroke and diabetes.   3. What are the treatment options? In most situations, sleep apnea is a lifelong disease that must be managed with daily therapy. Medications are not effective for sleep apnea and surgery is generally not considered until other therapies have been tried. Your treatment is your choice . Continuous Positive Airway (CPAP) works right away and is the therapy that is effective in nearly everyone. An oral device to hold your jaw forward is usually the next most " reliable option. Other options include postioning devices (to keep you off your back), weight loss, and surgery including a tongue pacing device. There is more detail about some of these options below.  4. Are my sleep studies covered by insurance? Although we will request verification of coverage, we advise you also check in advance of the study to ensure there is coverage.    Important tips for those choosing CPAP and similar devices  REMEMBER-IF YOU RECEIVE A CALL FROM  [phone]-->IT IS TO SETUP A DEVICE  For new devices, sign up for device ALLYSON to monitor your device for your followup visits  We encourage you to utilize the [employer] by [employer] allyson or website ([employer] web (resmed.com) ) to monitor your therapy progress and share the data with your healthcare team when you discuss your sleep apnea.                                                    Know your equipment:  CPAP is continuous positive airway pressure that prevents obstructive sleep apnea by keeping the throat from collapsing while you are sleeping. In most cases, the device is  smart  and can slowly self-adjusts if your throat collapses and keeps a record every day of how well you are treated-this information is available to you and your care team.  BPAP is bilevel positive airway pressure that keeps your throat open and also assists each breath with a pressure boost to maintain adequate breathing.  Special kinds of BPAP are used in patients who have inadequate breathing from lung or heart disease. In most cases, the device is  smart  and can slowly self-adjusts to assist breathing. Like CPAP, the device keeps a record of how well you are treated.  Your mask is your connection to the device. You get to choose what feels most comfortable and the staff will help to make sure if fits. Here: are some examples of the different masks that are available: Magnetic mask aids may assist with use but there are safety issues that should be addressed when considering  with magnets* ( see end of discussion).       Key points to remember on your journey with sleep apnea:  Sleep study.  PAP devices often need to be adjusted during a sleep study to show that they are effective and adjusted right.  Good tips to remember: Try wearing just the mask during a quiet time during the day so your body adapts to wearing it. A humidifier is recommended for comfort in most cases to prevent drying of your nose and throat. Allergy medication from your provider may help you if you are having nasal congestion.  Getting settled-in. It takes more than one night for most of us to get used to wearing a mask. Try wearing just the mask during a quiet time during the day so your body adapts to wearing it. A humidifier is recommended for comfort in most cases. Our team will work with you carefully on the first day and will be in contact within 4 days and again at 2 and 4 weeks for advice and remote device adjustments. Your therapy is evaluated by the device each day.   Use it every night. The more you are able to sleep naturally for 7-8 hours, the more likely you will have good sleep and to prevent health risks or symptoms from sleep apnea. Even if you use it 4 hours it helps. Occasionally all of us are unable to use a medical therapy, in sleep apnea, it is not dangerous to miss one night.   Communicate. Call our skilled team on the number provided on the first day if your visit for problems that make it difficult to wear the device. Over 2 out of 3 patients can learn to wear the device long-term with help from our team. Remember to call our team or your sleep providers if you are unable to wear the device as we may have other solutions for those who cannot adapt to mask CPAP therapy. It is recommended that you sleep your sleep provider within the first 3 months and yearly after that if you are not having problems.   Use it for your health. We encourage use of CPAP masks during daytime quiet periods to  allow your face and brain to adapt to the sensation of CPAP so that it will be a more natural sensation to awaken to at night or during naps. This can be very useful during the first few weeks or months of adapting to CPAP though it does not help medically to wear CPAP during wakefulness and  should not be used as a strategy just to meet guidelines.  Take care of your equipment. Make sure you clean your mask and tubing using directions every day and that your filter and mask are replaced as recommended or if they are not working.     *Masks with magnets:  Updated Contraindications  Masks with magnetic components are contraindicated for use by patients where they, or anyone in close physical contact while using the mask, have the following:   Active medical implants that interact with magnets (i.e., pacemakers, implantable cardioverter defibrillators (ICD), neurostimulators, cerebrospinal fluid (CSF) shunts, insulin/infusion pumps)   Metallic implants/objects containing ferromagnetic material (i.e., aneurysm clips/flow disruption devices, embolic coils, stents, valves, electrodes, implants to restore hearing or balance with implanted magnets, ocular implants, metallic splinters in the eye)  Updated Warning  Keep the mask magnets at a safe distance of at least 6 inches (150 mm) away from implants or medical devices that may be adversely affected by magnetic interference. This warning applies to you or anyone in close physical contact with your mask. The magnets are in the frame and lower headgear clips, with a magnetic field strength of up to 400mT. When worn, they connect to secure the mask but may inadvertently detach while asleep.  Implants/medical devices, including those listed within contraindications, may be adversely affected if they change function under external magnetic fields or contain ferromagnetic materials that attract/repel to magnetic fields (some metallic implants, e.g., contact lenses with metal,  dental implants, metallic cranial plates, screws, dilcia hole covers, and bone substitute devices). Consult your physician and  of your implant / other medical device for information on the potential adverse effects of magnetic fields.    BESIDES CPAP, WHAT OTHER THERAPIES ARE THERE?    Positioning Device  Positioning devices are generally used when sleep apnea is mild and only occurs on your back.This example shows a pillow that straps around the waist. It may be appropriate for those whose sleep study shows milder sleep apnea that occurs primarily when lying flat on one's back. Preliminary studies have shown benefit but effectiveness at home may need to be verified by a home sleep test. These devices are generally not covered by medical insurance.  Examples of devices that maintain sleeping on the back to prevent snoring and mild sleep apnea.    Belt type body positioner  http://[employer]/    Electronic reminder  http://nightshifttherapy.[employer]/            Oral Appliance  What is oral appliance therapy?  An oral appliance device fits on your teeth at night like a retainer used after having braces. The device is made by a specialized dentist and requires several visits over 1-2 months before a manufactured device is made to fit your teeth and is adjusted to prevent your sleep apnea. Once an oral device is working properly, snoring should be improved. A home sleep test may be recommended at that time if to determine whether the sleep apnea is adequately treated.       Some things to remember:  -Oral devices are often, but not always, covered by your medical insurance. Be sure to check with your insurance provider.   -If you are referred for oral therapy, you will be given a list of specialized dentists to consider or you may choose to visit the Web site of the American Academy of Dental Sleep Medicine  -Oral devices are less likely to work if you have severe sleep apnea or are extremely overweight.      More detailed information  An oral appliance is a small acrylic device that fits over the upper and lower teeth  (similar to a retainer or a mouth guard). This device slightly moves jaw forward, which moves the base of the tongue forward, opens the airway, improves breathing for effective treat snoring and obstructive sleep apnea in perhaps 7 out of 10 people .  The best working devices are custom-made by a dental device  after a mold is made of the teeth 1, 2, 3.  When is an oral appliance indicated?  Oral appliance therapy is recommended as a first-line treatment for patients with primary snoring, mild sleep apnea, and for patients with moderate sleep apnea who prefer appliance therapy to use of CPAP4, 5. Severity of sleep apnea is determined by sleep testing and is based on the number of respiratory events per hour of sleep.   How successful is oral appliance therapy?  The success rate of oral appliance therapy in patients with mild sleep apnea is 75-80% while in patients with moderate sleep apnea it is 50-70%. The chance of success in patients with severe sleep apnea is 40-50%. The research also shows that oral appliances have a beneficial effect on the cardiovascular health of YANIRA patients at the same magnitude as CPAP therapy7.  Oral appliances should be a second-line treatment in cases of severe sleep apnea, but if not completely successful then a combination therapy utilizing CPAP plus oral appliance therapy may be effective. Oral appliances tend to be effective in a broad range of patients although studies show that the patients who have the highest success are females, younger patients, those with milder disease, and less severe obesity. 3, 6.   Finding a dentist that practices dental sleep medicine  Specific training is available through the American Academy of Dental Sleep Medicine for dentists interested in working in the field of sleep. To find a dentist who is educated in the field of  sleep and the use of oral appliances, near you, visit the Web site of the American Academy of Dental Sleep Medicine.    References  1. Nguyen, et al. Objectively measured vs self-reported compliance during oral appliance therapy for sleep-disordered breathing. Chest 2013; 144(5): 5607-9040.  2. Du et al. Objective measurement of compliance during oral appliance therapy for sleep-disordered breathing. Thorax 2013; 68(1): 91-96.  3. Paras et al. Mandibular advancement devices in 620 men and women with YANIRA and snoring: tolerability and predictors of treatment success. Chest 2004; 125: 4951-9786.  4. Kingsley, et al. Oral appliances for snoring and YANIRA: a review. Sleep 2006; 29: 244-262.  5. Kalie et al. Oral appliance treatment for YANIRA: an update. J Clin Sleep Med 2014; 10(2): 215-227.  6. Meera et al. Predictors of OSAH treatment outcome. J Dent Res 2007; 86: 3065-9120.      Weight Loss:   Your Body mass index is 33.78 kg/m .    Being overweight does not necessarily mean you will have health consequences.  Those who have BMI over 35 or over 27 with existing medical conditions carries greater risk.   Weight loss decreases severity of sleep apnea in most people with obesity. For those with mild obesity who have developed snoring with weight gain, even 15-30 pound weight loss can improve and occasionally milder eliminate sleep apnea.  Structured and life-long dietary and health habits are necessary to lose weight and keep healthier weight levels.     The Comprehensive Weight loss program offers all aspects of weight loss strategies including two Non-Surgical Weight Loss Programs: Medical Weight Management and our 24 Week Healthy Lifestyle Program:    Medical Weight Management: You will meet with a Medical Weight Management Provider, as well as a Registered Dietician. The program may include medication therapy, dietary education, recommended exercise and physical therapy programs, monthly  support group meetings, and possible psychological counseling. Follow up visits with the provider or dietician are scheduled based on your progress and needs.    24 Week Healthy Lifestyle Program: This unique program is designed to give you the support of weekly appointments and activities thru a 24-week period. It may include all of the components of the basic program (above), with the addition of 11 individual Health  Visits, 24-week access to the [employer] website for over 700 online classes, and monthly support group meetings. This program has an out-of-pocket expense of $499 to cover the items that can not be billed to insurance (health coaches and [facility] access), and is non-refundable/non-transferable (you may be able to use a Health Savings Account; ask your HSA provider). There may be an optional meal replacement plan prescribed as well.   Surgical management achieves meaningful long-term weight loss and improvement in health risks in most patients with more severe obesity.      Sleep Apnea Surgery:    Surgery for obstructive sleep apnea is considered generally only when other therapies fail to work. Surgery may be discussed with you if you are having a difficult time tolerating CPAP and or when there is an abnormal structure that requires surgical correction.  Nose and throat surgeries often enlarge the airway to prevent collapse.  Most of these surgeries create pain for 1-2 weeks and up to half of the most common surgeries are not effective throughout life.  You should carefully discuss the benefits and drawbacks to surgery with your sleep provider and surgeon to determine if it is the best solution for you.   More information  Surgery for YANIRA is directed at areas that are responsible for narrowing or complete obstruction of the airway during sleep.  There are a wide range of procedures available to enlarge and/or stabilize the airway to prevent blockage of breathing in the three major areas where  it can occur: the palate, tongue, and nasal regions.  Successful surgical treatment depends on the accurate identification of the factors responsible for obstructive sleep apnea in each person.  A personalized approach is required because there is no single treatment that works well for everyone.  Because of anatomic variation, consultation with an examination by a sleep surgeon is a critical first step in determining what surgical options are best for each patient.  In some cases, examination during sedation may be recommended in order to guide the selection of procedures.  Patients will be counseled about risks and benefits as well as the typical recovery course after surgery. Surgery is typically not a cure for a person s YANIRA.  However, surgery will often significantly improve one s YANIRA severity (termed  success rate ).  Even in the absence of a cure, surgery will decrease the cardiovascular risk associated with OSA7; improve overall quality of life8 (sleepiness, functionality, sleep quality, etc).      Palate Procedures:  Patients with YANIRA often have narrowing of their airway in the region of their tonsils and uvula.  The goals of palate procedures are to widen the airway in this region as well as to help the tissues resist collapse.  Modern palate procedure techniques focus on tissue conservation and soft tissue rearrangement, rather than tissue removal.  Often the uvula is preserved in this procedure. Residual sleep apnea is common in patient after pharyngoplasty with an average reduction in sleep apnea events of 33%2.      Tongue Procedures:  ExamWhile patients are awake, the muscles that surround the throat are active and keep this region open for breathing. These muscles relax during sleep, allowing the tongue and other structures to collapse and block breathing.  There are several different tongue procedures available.  Selection of a tongue base procedure depends on characteristics seen on physical exam.   Generally, procedures are aimed at removing bulky tissues in this area or preventing the back of the tongue from falling back during sleep.  Success rates for tongue surgery range from 50-62%3.    Hypoglossal Nerve Stimulation:  Hypoglossal nerve stimulation has recently received approval from the [place] Food and Drug Administration for the treatment of obstructive sleep apnea.  This is based on research showing that the system was safe and effective in treating sleep apnea6.  Results showed that the median AHI score decreased 68%, from 29.3 to 9.0. This therapy uses an implant system that senses breathing patterns and delivers mild stimulation to airway muscles, which keeps the airway open during sleep.  The system consists of three fully implanted components: a small generator (similar in size to a pacemaker), a breathing sensor, and a stimulation lead.  Using a small handheld remote, a patient turns the therapy on before bed and off upon awakening.    Candidates for this device must be greater than 18 years of age, have moderate to severe obstructive sleep apnea with less than 25% central events  (AHI between 15-65), BMI less than 35, have tried CPAP/oral appliance for at least 8 weeks without success, and have appropriate upper airway anatomy (determined by a sleep endoscopy performed by Dr. Ion Leal or Dr. Daniel Dawson).    Nasal Procedures:  Nasal obstruction can interfere with nasal breathing during the day and night.  Studies have shown that relief of nasal obstruction can improve the ability of some patients to tolerate positive airway pressure therapy for obstructive sleep apnea1.  Treatment options include medications such as nasal saline, topical corticosteroid and antihistamine sprays, and oral medications such as antihistamines or decongestants. Non-surgical treatments can include external nasal dilators for selected patients. If these are not successful by themselves, surgery can improve  the nasal airway either alone or in combination with these other options.        Combination Procedures:  Combination of surgical procedures and other treatments may be recommended, particularly if patients have more than one area of narrowing or persistent positional disease.  The success rate of combination surgery ranges from 66-80%2,3.    References  Mario CHAVEZ. The Role of the Nose in Snoring and Obstructive Sleep Apnoea: An Update.  Eur Arch Otorhinolaryngol. 2011; 268: 1365-73.   Elinor SM; Edda JA; Suha JR; Pallanch JF; Juliocesar MB; Omkar SG; Ra EVANS. Surgical modifications of the upper airway for obstructive sleep apnea in adults: a systematic review and meta-analysis. SLEEP 2010;33(10):2335-1039. Sally ROSS. Hypopharyngeal surgery in obstructive sleep apnea: an evidence-based medicine review.  Arch Otolaryngol Head Neck Surg. 2006 Feb;132(2):206-13.  [facility] Y[facility]1, Alexia Y, Massimo PAUL. The efficacy of anatomically based multilevel surgery for obstructive sleep apnea. Otolaryngol Head Neck Surg. 2003 Oct;129(4):327-35.  Sally ROSS, Goldberg A. Hypopharyngeal Surgery in Obstructive Sleep Apnea: An Evidence-Based Medicine Review. Arch Otolaryngol Head Neck Surg. 2006 Feb;132(2):206-13.  Deidra BURDICK et al. Upper-Airway Stimulation for Obstructive Sleep Apnea.  N Engl J Med. 2014 Jan 9;370(2):139-49.  Vidal Y et al. Increased Incidence of Cardiovascular Disease in Middle-aged Men with Obstructive Sleep Apnea. Am J Respir Crit Care Med; 2002 166: 159-165  Allan EM et al. Studying Life Effects and Effectiveness of Palatopharyngoplasty (SLEEP) study: Subjective Outcomes of Isolated Uvulopalatopharyngoplasty. Otolaryngol Head Neck Surg. 2011; 144: 623-631.        WHAT IF I ONLY HAVE SNORING?    Mandibular advancement devices, lateral sleep positioning, long-term weight loss and treatment of nasal allergies have been shown to improve snoring.  Exercising tongue muscles with a game  (https://[employer]/us/allyson/soundly-reduce-snoring/ol0692099114) or stimulating the tongue during the day with a device (https://doi.org/10.3390/rrn20643339) have improved snoring in some individuals.  https://www.[employer]/  https://www.sleepfoundation.org/best-anti-snoring-mouthpieces-and-mouthguards    Remember to Drive Safe... Drive Alive     Sleep health profoundly affects your health, mood, and your safety.  Thirty three percent of the population (one in three of us) is not getting enough sleep and many have a sleep disorder. Not getting enough sleep or having an untreated / undertreated sleep condition may make us sleepy without even knowing it. In fact, our driving could be dramatically impaired due to our sleep health. As your provider, here are some things I would like you to know about driving:     Here are some warning signs for impairment and dangerous drowsy driving:              -Having been awake more than 16 hours               -Looking tired               -Eyelid drooping              -Head nodding (it could be too late at this point)              -Driving for more than 30 minutes     Some things you could do to make the driving safer if you are experiencing some drowsiness:              -Stop driving and rest              -Call for transportation              -Make sure your sleep disorder is adequately treated     Some things that have been shown NOT to work when experiencing drowsiness while driving:              -Turning on the radio              -Opening windows              -Eating any  distracting  /  entertaining  foods (e.g., sunflower seeds, candy, or any other)              -Talking on the phone      Your decision may not only impact your life, but also the life of others. Please, remember to drive safe for yourself and all of us.           Your Body mass index is 33.78 kg/m .  Weight management is a personal decision.  If you are interested in exploring weight loss strategies,  the following discussion covers the approaches that may be successful. Body mass index (BMI) is one way to tell whether you are at a healthy weight, overweight, or obese. It measures your weight in relation to your height.  A BMI of 18.5 to 24.9 is in the healthy range. A person with a BMI of 25 to 29.9 is considered overweight, and someone with a BMI of 30 or greater is considered obese. More than two-thirds of American adults are considered overweight or obese.  Being overweight or obese increases the risk for further weight gain. Excess weight may lead to heart disease and diabetes.  Creating and following plans for healthy eating and physical activity may help you improve your health.  Weight control is part of healthy lifestyle and includes exercise, emotional health, and healthy eating habits. Careful eating habits lifelong are the mainstay of weight control. Though there are significant health benefits from weight loss, long-term weight loss with diet alone may be very difficult to achieve- studies show long-term success with dietary management in less than 10% of people. Attaining a healthy weight may be especially difficult to achieve in those with severe obesity. In some cases, medications, devices and surgical management might be considered.  What can you do?  If you are overweight or obese and are interested in methods for weight loss, you should discuss this with your provider.   Consider reducing daily calorie intake by 500 calories.   Keep a food journal.   Avoiding skipping meals, consider cutting portions instead.    Diet combined with exercise helps maintain muscle while optimizing fat loss. Strength training is particularly important for building and maintaining muscle mass. Exercise helps reduce stress, increase energy, and improves fitness. Increasing exercise without diet control, however, may not burn enough calories to loose weight.     Start walking three days a week 10-20 minutes at a  time  Work towards walking thirty minutes five days a week   Eventually, increase the speed of your walking for 1-2 minutes at time    In addition, we recommend that you review healthy lifestyles and methods for weight loss available through the National Institutes of Health patient information sites:  http://win.niddk.nih.gov/publications/index.htm    And look into health and wellness programs that may be available through your health insurance provider, employer, local community center, or ginna club.

## 2024-03-18 NOTE — NURSING NOTE
Is the patient currently in the state of [place]? YES    Visit mode:VIDEO    If the visit is dropped, the patient can be reconnected by: VIDEO VISIT: Send to e-mail at: [email or web address]    Will anyone else be joining the visit? NO  (If patient encounters technical issues they should call [phone]    How would you like to obtain your AVS? MyChart    Are changes needed to the allergy or medication list? No    Reason for visit: Consult    Debby JONAS  Has patient had flu shot for current/most recent flu season? If so, when? Yes: 2/14/24

## 2024-04-22 ENCOUNTER — OFFICE VISIT (OUTPATIENT)
Dept: SLEEP MEDICINE | Facility: CLINIC | Age: 39
End: 2024-04-22
Attending: PHYSICIAN ASSISTANT
Payer: COMMERCIAL

## 2024-04-22 DIAGNOSIS — G47.30 SLEEP APNEA, UNSPECIFIED TYPE: ICD-10-CM

## 2024-04-22 DIAGNOSIS — E66.811 CLASS 1 OBESITY DUE TO EXCESS CALORIES WITH SERIOUS COMORBIDITY AND BODY MASS INDEX (BMI) OF 33.0 TO 33.9 IN ADULT: ICD-10-CM

## 2024-04-22 DIAGNOSIS — R06.00 DYSPNEA AND RESPIRATORY ABNORMALITY: ICD-10-CM

## 2024-04-22 DIAGNOSIS — E66.09 CLASS 1 OBESITY DUE TO EXCESS CALORIES WITH SERIOUS COMORBIDITY AND BODY MASS INDEX (BMI) OF 33.0 TO 33.9 IN ADULT: ICD-10-CM

## 2024-04-22 DIAGNOSIS — R53.83 MALAISE AND FATIGUE: ICD-10-CM

## 2024-04-22 DIAGNOSIS — R06.83 SNORING: ICD-10-CM

## 2024-04-22 DIAGNOSIS — Z72.820 LACK OF ADEQUATE SLEEP: ICD-10-CM

## 2024-04-22 DIAGNOSIS — R06.89 DYSPNEA AND RESPIRATORY ABNORMALITY: ICD-10-CM

## 2024-04-22 DIAGNOSIS — R53.81 MALAISE AND FATIGUE: ICD-10-CM

## 2024-04-22 PROCEDURE — G0399 HOME SLEEP TEST/TYPE 3 PORTA: HCPCS | Performed by: INTERNAL MEDICINE

## 2024-04-22 ASSESSMENT — SLEEP AND FATIGUE QUESTIONNAIRES
HOW LIKELY ARE YOU TO NOD OFF OR FALL ASLEEP WHILE SITTING QUIETLY AFTER LUNCH WITHOUT ALCOHOL: WOULD NEVER DOZE
HOW LIKELY ARE YOU TO NOD OFF OR FALL ASLEEP WHEN YOU ARE A PASSENGER IN A CAR FOR AN HOUR WITHOUT A BREAK: WOULD NEVER DOZE
HOW LIKELY ARE YOU TO NOD OFF OR FALL ASLEEP IN A CAR, WHILE STOPPED FOR A FEW MINUTES IN TRAFFIC: WOULD NEVER DOZE
HOW LIKELY ARE YOU TO NOD OFF OR FALL ASLEEP WHILE SITTING INACTIVE IN A PUBLIC PLACE: WOULD NEVER DOZE
HOW LIKELY ARE YOU TO NOD OFF OR FALL ASLEEP WHILE WATCHING TV: SLIGHT CHANCE OF DOZING
HOW LIKELY ARE YOU TO NOD OFF OR FALL ASLEEP WHILE SITTING AND READING: SLIGHT CHANCE OF DOZING
HOW LIKELY ARE YOU TO NOD OFF OR FALL ASLEEP WHILE SITTING AND TALKING TO SOMEONE: WOULD NEVER DOZE
HOW LIKELY ARE YOU TO NOD OFF OR FALL ASLEEP WHILE LYING DOWN TO REST IN THE AFTERNOON WHEN CIRCUMSTANCES PERMIT: MODERATE CHANCE OF DOZING

## 2024-04-22 NOTE — PROGRESS NOTES
Pt is completing a home sleep test. Pt was instructed on how to put on the Noxturnal T3 device and associated equipment before going to bed and given the opportunity to practice putting it on before leaving the sleep center. Pt was reminded to bring the home sleep test kit back to the center tomorrow, at agreed upon time for download and reporting.   Lisbeth Centeno CMA on 4/22/2024 at 12:57 PM

## 2024-04-23 ENCOUNTER — DOCUMENTATION ONLY (OUTPATIENT)
Dept: SLEEP MEDICINE | Facility: CLINIC | Age: 39
End: 2024-04-23
Payer: COMMERCIAL

## 2024-04-23 NOTE — NURSING NOTE
Pt returned HST device. It was downloaded and forwarded data to the clinical specialist for scoring.  Lisbeth Centeno CMA on 4/23/2024 at 9:36 AM

## 2024-04-23 NOTE — PROGRESS NOTES
HST POST-STUDY QUESTIONNAIRE    What time did you go to bed?  10:30 pm  How long do you think it took to fall asleep?  15 mins  What time did you wake up to start the day?  5:50 am  Did you get up during the night at all?  Yes  If you woke up, do you remember approximately what time(s)? 3:27 and 5:00 am.  Did you have any difficulty with the equipment?  No  Did you us any type of treatment with this study?  None  Was the head of the bed elevated? No  Did you sleep in a recliner?  No  Did you stop using CPAP at least 3 days before this test?  NA  Any other information you'd like us to know?

## 2024-04-24 NOTE — PROGRESS NOTES
This HSAT was performed using a Noxturnal T3 device which recorded snore, sound, movement activity, body position, nasal pressure, oronasal thermal airflow, pulse, oximetry and both chest and abdominal respiratory effort. HSAT data was restricted to the time patient states they were in bed.     HSAT was scored using 1B 4% hypopnea rule.     AHI: 17  Snoring was reported as loud.  Time with SpO2 below 89% was 12.8 minutes.   Overall signal quality was good     Pt will follow up with sleep provider to determine appropriate therapy.     Ordering Provider, Yesika Jones PA C. Oyugi, BA, RPSGT, RST System Clinical Specialist/ 4/24/2024

## 2024-04-25 NOTE — PROCEDURES
"HOME SLEEP STUDY INTERPRETATION        Patient: Oli Gage  MRN: [id number]  YOB: 1985  Study Date: 4/22/2024  PCP/Referring Provider: Bennett Cody;   Ordering Provider:   Yesika Jones PA-C         Indications for Home Study: Oli Gage is a 38 year old male with symptoms suggestive of obstructive sleep apnea.    Estimated body mass index is 33.78 kg/m  as calculated from the following:    Height as of 3/18/24: 1.854 m (6' 1\").    Weight as of 3/18/24: 116.1 kg (256 lb).  Total score - Sharon: 4 (4/22/2024  8:09 AM)  StopBang Total Score: 4 (3/18/2024  8:00 AM)Total Score: 7 (4/22/2024  8:10 AM)        Data: A full night home sleep study was performed recording the standard physiologic parameters including body position, movement, sound, nasal pressure, thermal oral airflow, chest and abdominal movements with respiratory inductance plethysmography, and oxygen saturation by pulse oximetry. Pulse rate was estimated by oximetry recording. This study was considered adequate based on > 4 hours of quality oximetry and respiratory recording. As specified by the AASM Manual for the Scoring of Sleep and Associated events, version 2.3, Rule VIII.D 1B, 4% oxygen desaturation scoring for hypopneas is used as a standard of care on all home sleep apnea testing.    Airflow signals were occasionally poor     Analysis Time:  415 minutes        Respiration:   Sleep Associated Hypoxemia: sustained hypoxemia was present. Baseline oxygen saturation was 91%.  Time with saturation less than or equal to 88% was 2 minutes. The lowest oxygen saturation was 80%.   Snoring: Snoring was present.  Respiratory events: The home study revealed a presence of 22 obstructive apneas and 1 mixed and central apneas. There were 95 hypopneas resulting in a combined apnea/hypopnea index [AHI] of 17 events per hour.  AHI was 32 per hour supine, n/a per hour prone, 5 per hour on left side, and 12 per hour on right side. "   Pattern: Excluding events noted above, respiratory rate and pattern was Normal.      Position: Percent of time spent: supine - 38%, prone - 0%, on left - 5%, on right - 12%.      Heart Rate: By pulse oximetry normal rate was noted.       Assessment:   Moderate obstructive sleep apnea (s ever supine, mild non-supine)  Sleep associated hypoxemia was not present.    Recommendations:  Consider auto-CPAP at 5-15 cmH2O, oral appliance therapy, positional therapy, or surgical options.  Suggest optimizing sleep hygiene and avoiding sleep deprivation.  Weight management.        Diagnosis Code(s): Obstructive Sleep Apnea G47.33    Electronically signed by: Reymundo Paula MD, April 25, 2024   Diplomate, American Board of Internal Medicine, Sleep Medicine

## 2024-06-15 DIAGNOSIS — G47.33 OSA (OBSTRUCTIVE SLEEP APNEA): Primary | ICD-10-CM

## 2024-06-15 NOTE — RESULT ENCOUNTER NOTE
Your Home Sleep Apnea Test does show mild-moderate obstructive sleep apnea without a significant amount of low oxygen levels     Per our discussion, I have ordered a dental referral for a mandibular advancement device. I have enclosed a list of sleep dentists below. It will be up to you to contact them to find one that takes your medical insurance as [facility] does not have a sleep dentists on staff.     After your device is adjusted you may contact us to order another Home Sleep Apnea Test to assess the efficacy

## 2024-06-28 ASSESSMENT — SLEEP AND FATIGUE QUESTIONNAIRES
HOW LIKELY ARE YOU TO NOD OFF OR FALL ASLEEP WHILE LYING DOWN TO REST IN THE AFTERNOON WHEN CIRCUMSTANCES PERMIT: MODERATE CHANCE OF DOZING
HOW LIKELY ARE YOU TO NOD OFF OR FALL ASLEEP WHILE SITTING INACTIVE IN A PUBLIC PLACE: WOULD NEVER DOZE
HOW LIKELY ARE YOU TO NOD OFF OR FALL ASLEEP WHILE SITTING QUIETLY AFTER LUNCH WITHOUT ALCOHOL: WOULD NEVER DOZE
HOW LIKELY ARE YOU TO NOD OFF OR FALL ASLEEP IN A CAR, WHILE STOPPED FOR A FEW MINUTES IN TRAFFIC: WOULD NEVER DOZE
HOW LIKELY ARE YOU TO NOD OFF OR FALL ASLEEP WHILE SITTING AND TALKING TO SOMEONE: WOULD NEVER DOZE
HOW LIKELY ARE YOU TO NOD OFF OR FALL ASLEEP WHILE SITTING AND READING: MODERATE CHANCE OF DOZING
HOW LIKELY ARE YOU TO NOD OFF OR FALL ASLEEP WHILE WATCHING TV: SLIGHT CHANCE OF DOZING
HOW LIKELY ARE YOU TO NOD OFF OR FALL ASLEEP WHEN YOU ARE A PASSENGER IN A CAR FOR AN HOUR WITHOUT A BREAK: WOULD NEVER DOZE

## 2024-07-03 ENCOUNTER — VIRTUAL VISIT (OUTPATIENT)
Dept: SLEEP MEDICINE | Facility: CLINIC | Age: 39
End: 2024-07-03
Payer: COMMERCIAL

## 2024-07-03 VITALS — WEIGHT: 242 LBS | BODY MASS INDEX: 32.78 KG/M2 | HEIGHT: 72 IN

## 2024-07-03 DIAGNOSIS — G47.33 OSA (OBSTRUCTIVE SLEEP APNEA): Primary | ICD-10-CM

## 2024-07-03 PROCEDURE — 99213 OFFICE O/P EST LOW 20 MIN: CPT | Mod: 95 | Performed by: PHYSICIAN ASSISTANT

## 2024-07-03 ASSESSMENT — PAIN SCALES - GENERAL: PAINLEVEL: NO PAIN (0)

## 2024-07-03 NOTE — PATIENT INSTRUCTIONS
You will be provided with an auto-titrating CPAP with a pressure range of 6-16 cm with heated humidity to limit nasal congestion. Adjust the heat level on humidifier to find a setting that prevents dry nose but does not cause condensation in the hose or mask. Use distilled water in the humidifier.  The CPAP has a ramp function that starts the pressure lower than your prescribed pressure and gradually increases it over a number of minutes.  This may make it easier to fall asleep.    Try to use the CPAP every-night, all night (minimum of 4 hours). Many insurances require that we prove you are using the CPAP at least 4 hours on at least 70% of nights over a 30 day period. We have 90 days to meet those criteria.            Discussed weight management and the impact of weight gain on sleep apnea.  Let me know if you snore or feel the pressure is too high.    You can get new supplies (mask, hose and filter) for your CPAP every 3-6 months, covered by insurance. You do not need to get supplies that often, but they are available if you would like them.  You may exchange the mask once within the first month if you feel the initial mask does not fit well.  Contact your medical equipment provider for equipment issues.  Please let me know if you have any return of snoring, daytime sleepiness or poor sleep quality. We will want to make sure your CPAP is adequately treating your apnea.  There is a website called CPAP.com that has accessories that may make CPAP use easier. Please visit it at your convenience.      [facility] [phone]     Follow-up 2-3 months.  Bring your CPAP machine with you to the follow up appointment.     Your Body mass index is 32.82 kg/m .  Weight management is a personal decision.  If you are interested in exploring weight loss strategies, the following discussion covers the approaches that may be successful. Body mass index (BMI) is one way to tell whether you are at a healthy  weight, overweight, or obese. It measures your weight in relation to your height.  A BMI of 18.5 to 24.9 is in the healthy range. A person with a BMI of 25 to 29.9 is considered overweight, and someone with a BMI of 30 or greater is considered obese. More than two-thirds of American adults are considered overweight or obese.  Being overweight or obese increases the risk for further weight gain. Excess weight may lead to heart disease and diabetes.  Creating and following plans for healthy eating and physical activity may help you improve your health.  Weight control is part of healthy lifestyle and includes exercise, emotional health, and healthy eating habits. Careful eating habits lifelong are the mainstay of weight control. Though there are significant health benefits from weight loss, long-term weight loss with diet alone may be very difficult to achieve- studies show long-term success with dietary management in less than 10% of people. Attaining a healthy weight may be especially difficult to achieve in those with severe obesity. In some cases, medications, devices and surgical management might be considered.  What can you do?  If you are overweight or obese and are interested in methods for weight loss, you should discuss this with your provider.   Consider reducing daily calorie intake by 500 calories.   Keep a food journal.   Avoiding skipping meals, consider cutting portions instead.    Diet combined with exercise helps maintain muscle while optimizing fat loss. Strength training is particularly important for building and maintaining muscle mass. Exercise helps reduce stress, increase energy, and improves fitness. Increasing exercise without diet control, however, may not burn enough calories to loose weight.     Start walking three days a week 10-20 minutes at a time  Work towards walking thirty minutes five days a week   Eventually, increase the speed of your walking for 1-2 minutes at time    In  addition, we recommend that you review healthy lifestyles and methods for weight loss available through the National Institutes of Health patient information sites:  http://win.niddk.nih.gov/publications/index.htm    And look into health and wellness programs that may be available through your health insurance provider, employer, local community center, or ginna club.

## 2024-07-03 NOTE — NURSING NOTE
Current patient location: [address] [address]   Is the patient currently in the state of [place]? YES    Visit mode:VIDEO    If the visit is dropped, the patient can be reconnected by: VIDEO VISIT: Text to cell phone:   Telephone Information:   Mobile [phone]       Will anyone else be joining the visit? NO  (If patient encounters technical issues they should call [phone])    How would you like to obtain your AVS? MyChart    Are changes needed to the allergy or medication list? Pt stated no med changes    Are refills needed on medications prescribed by this physician? NO    Reason for visit: RECHECK    Geovanna Stevens VVNEYMAR  Has patient had flu shot for current/most recent flu season? If so, when? Yes: 02.14.24

## 2024-07-03 NOTE — PROGRESS NOTES
Video-Visit Details    Type of service:  Video Visit    Video Visit Start Time:2:44 PM    Video End Time:2:57 PM    Originating Location (pt. Location): Home      Distant Location (provider location):  On-site     Platform used for Video Visit: [employer]    Home Sleep Apnea Testing Results Visit:    Chief Complaint   Patient presents with    RECHECK       Oli Gage is a 38 year old male who returns to [facility] after having had Home Sleep Apnea Testing.  He presented with loud snoring, witnessed apnea, non-refreshing sleep, bruxism and daytime fatigue/sleepiness (ESS 4). The STOP-BANG score is 4/8.      Home Sleep Apnea Testing - 4/22/24: 256 lbs 0 oz: AHI 17/hr. Supine AHI 32/hr.   Oxygen Tayo of 80%.  Baseline 91%.  Sp02 =< 88% for 2 minutes  He slept on his back (36%), prone (0%), left (35%) and right (29%) sides.   Analysis time: 415 minutes.     Signal quality of Oxymeter 99% Good  Nasal Cannula 100% Good  RIP belts 100% Good.     Oli Gage reports that he slept Fair.       Past medical/surgical history, family history, social history, medications and allergies were reviewed.      Ht 1.829 m (6')   Wt 109.8 kg (242 lb)   BMI 32.82 kg/m      Impression/Plan:  Moderate Obstructive Sleep Apnea.   Sleep associated hypoxemia was not present.      Home Sleep Apnea Testing was reviewed in detail today with Oli and a copy given to him for his records.  Treatment options discussed today including  auto-CPAP at 6-16 cmH2O, oral appliance therapy, positional therapy, polysomnography with full night PAP titration, or surgical options.    Elected treatment with auto-CPAP at 6-16 cmH2O.  Follow up in 3 months.     20 minutes spent on day of encounter doing chart review,  history and exam, counseling, coordinating plan of care, documentation and further activities as noted above.     Yesika Jones PA-C  Sleep Medicine

## 2024-07-10 ENCOUNTER — DOCUMENTATION ONLY (OUTPATIENT)
Dept: SLEEP MEDICINE | Facility: CLINIC | Age: 39
End: 2024-07-10
Payer: COMMERCIAL

## 2024-07-10 DIAGNOSIS — E66.09 CLASS 1 OBESITY DUE TO EXCESS CALORIES WITH SERIOUS COMORBIDITY AND BODY MASS INDEX (BMI) OF 33.0 TO 33.9 IN ADULT: Primary | Chronic | ICD-10-CM

## 2024-07-10 DIAGNOSIS — G47.33 OBSTRUCTIVE SLEEP APNEA (ADULT) (PEDIATRIC): ICD-10-CM

## 2024-07-10 DIAGNOSIS — E66.811 CLASS 1 OBESITY DUE TO EXCESS CALORIES WITH SERIOUS COMORBIDITY AND BODY MASS INDEX (BMI) OF 33.0 TO 33.9 IN ADULT: Primary | Chronic | ICD-10-CM

## 2024-07-10 NOTE — PROGRESS NOTES
Patient was offered choice of vendor and chose [facility].  Patient Oil Gage was set up at [place]  on July 10, 2024. Patient received a Resmed Airsense 10 Pressures were set at  6-16 cm H2O.   Patient s ramp is 5 cm H2O for Off and FLEX/EPR is EPR, 2.  Patient received a Resmed Mask name: Airfit [id number]  Nasal mask size Medium, heated tubing and heated humidifier.  Patient has the following compliance requirements: none  Patient has a follow up on TBD with CHRISTINA Dutta

## 2024-07-15 ENCOUNTER — DOCUMENTATION ONLY (OUTPATIENT)
Dept: SLEEP MEDICINE | Facility: CLINIC | Age: 39
End: 2024-07-15
Payer: COMMERCIAL

## 2024-07-15 DIAGNOSIS — E66.09 CLASS 1 OBESITY DUE TO EXCESS CALORIES WITH SERIOUS COMORBIDITY AND BODY MASS INDEX (BMI) OF 33.0 TO 33.9 IN ADULT: Primary | Chronic | ICD-10-CM

## 2024-07-15 DIAGNOSIS — E66.811 CLASS 1 OBESITY DUE TO EXCESS CALORIES WITH SERIOUS COMORBIDITY AND BODY MASS INDEX (BMI) OF 33.0 TO 33.9 IN ADULT: Primary | Chronic | ICD-10-CM

## 2024-07-15 NOTE — PROGRESS NOTES
3 day Sleep therapy management telephone visit    Diagnostic AHI:  HST: 17        Confirmed with patient at time of call- N/A Patient is still interested in STM service       Message left for patient to return call    Order settings:  CPAP MIN CPAP MAX   6 cm H2O 16 cm H2O       Device settings:  CPAP MIN CPAP MAX EPR RESMED SOFT RESPONSE SETTING   6.0 cm  H20 16.0 cm  H20 TWO OFF       Compliance 80 %    Assessment: Nighty usage most nights over four hours     Patient has the following upcoming sleep appts:      Replacement device: No  STM ordered by provider: Yes     Total time spent on accessing and  interpreting remote patient PAP therapy data  10 minutes    Total time spent counseling, coaching  and reviewing PAP therapy data with patient  1 minutes    [id number] no

## 2024-07-25 ENCOUNTER — DOCUMENTATION ONLY (OUTPATIENT)
Dept: SLEEP MEDICINE | Facility: CLINIC | Age: 39
End: 2024-07-25
Payer: COMMERCIAL

## 2024-07-25 DIAGNOSIS — E66.811 CLASS 1 OBESITY DUE TO EXCESS CALORIES WITH SERIOUS COMORBIDITY AND BODY MASS INDEX (BMI) OF 33.0 TO 33.9 IN ADULT: Primary | Chronic | ICD-10-CM

## 2024-07-25 DIAGNOSIS — E66.09 CLASS 1 OBESITY DUE TO EXCESS CALORIES WITH SERIOUS COMORBIDITY AND BODY MASS INDEX (BMI) OF 33.0 TO 33.9 IN ADULT: Primary | Chronic | ICD-10-CM

## 2024-07-25 NOTE — PROGRESS NOTES
14  DAY STM VISIT    Diagnostic AHI:  HST: 17           Subjective measures:  Patient reports doing ok with CPAP. He says it is strange to get used to. He wants to try a FFM. He will contact [facility]. Patient was given my contact information and instructed to reach out with any questions or concerns.     Assessment: Pt not meeting objective benchmarks for compliance        PAP settings:  CPAP MIN CPAP MAX 95TH % PRESSURE EPR RESMED SOFT RESPONSE SETTING   6.0 cm  H20 16.0 cm  H20 11 cm  H20  TWO OFF     Objective measures: 14 day rolling measures   COMPLIANCE LEAK AHI AVERAGE USE IN MINUTES   50 % 13.89 4.31 279   GOAL >70% GOAL < 24 LPM GOAL <5 GOAL >240      Patient has the following upcoming sleep appts:      Total time spent on accessing and interpreting remote patient PAP therapy data  10 minutes    Total time spent counseling, coaching  and reviewing PAP therapy data with patient  3 minute    [id number]  [id number]  no (3 day STM)

## 2025-01-11 ENCOUNTER — OFFICE VISIT (OUTPATIENT)
Dept: URGENT CARE | Facility: URGENT CARE | Age: 40
End: 2025-01-11
Payer: COMMERCIAL

## 2025-01-11 VITALS
HEART RATE: 80 BPM | OXYGEN SATURATION: 98 % | RESPIRATION RATE: 16 BRPM | SYSTOLIC BLOOD PRESSURE: 114 MMHG | TEMPERATURE: 98.7 F | DIASTOLIC BLOOD PRESSURE: 78 MMHG

## 2025-01-11 DIAGNOSIS — J11.1 INFLUENZA-LIKE ILLNESS: Primary | ICD-10-CM

## 2025-01-11 LAB
DEPRECATED S PYO AG THROAT QL EIA: NEGATIVE
S PYO DNA THROAT QL NAA+PROBE: NOT DETECTED

## 2025-01-11 PROCEDURE — 87651 STREP A DNA AMP PROBE: CPT | Performed by: PHYSICIAN ASSISTANT

## 2025-01-11 PROCEDURE — 99213 OFFICE O/P EST LOW 20 MIN: CPT | Performed by: PHYSICIAN ASSISTANT

## 2025-01-11 RX ORDER — OSELTAMIVIR PHOSPHATE 75 MG/1
75 CAPSULE ORAL 2 TIMES DAILY
Qty: 10 CAPSULE | Refills: 0 | Status: SHIPPED | OUTPATIENT
Start: 2025-01-11 | End: 2025-01-16

## 2025-01-11 NOTE — PROGRESS NOTES
Assessment & Plan:      Problem List Items Addressed This Visit    None  Visit Diagnoses       Influenza-like illness    -  Primary    Relevant Medications    oseltamivir (TAMIFLU) 75 MG capsule    Other Relevant Orders    Streptococcus A Rapid Screen w/Reflex to PCR - Clinic Collect (Completed)    Group A Streptococcus PCR Throat Swab          Medical Decision Making  Patient presents with sore throat for 3 to 4 days.  Rapid strep is negative.  Symptoms appear consistent with influenza-like illness.  Recommend Tamiflu.  No signs of respiratory distress.  Discussed treatment and symptomatic care.  Allergies and medication interactions reviewed.  Discussed signs of worsening symptoms and when to follow-up with PCP if no symptom improvement.     Subjective:      Oli Gage is a 39 year old male here for evaluation of sore throat.  Onset of symptoms was 3 to 4 days ago.  No other fevers, cough, rhinorrhea, or bodyaches.  Symptoms feel similar to previous strep throat infections.  Sore throat feels slightly improved today.  Patient has been using salt water gargles with good temporary relief.     The following portions of the patient's history were reviewed and updated as appropriate: allergies, current medications, and problem list.     Review of Systems  Pertinent items are noted in HPI.    Allergies  Allergies   Allergen Reactions    Phenytoin Rash       Family History   Problem Relation Age of Onset    Asthma Mother     Depression Mother     Anxiety Disorder Mother     Alcoholism Mother     Myelodysplastic syndrome Father         d age 69    Hypertension Brother     Depression Brother     Anxiety Disorder Brother     No Known Problems Daughter     No Known Problems Son        Social History     Tobacco Use    Smoking status: Never     Passive exposure: Never    Smokeless tobacco: Never   Substance Use Topics    Alcohol use: Not Currently        Objective:      /78   Pulse 80   Temp 98.7  F (37.1   C)   Resp 16   SpO2 98%   General appearance - alert, well appearing, and in no distress and non-toxic  Mouth - mild to moderate tonsil swelling with erythema bilaterally, no exudate  Neck - supple, no significant adenopathy     Lab & Imaging Results    Results for orders placed or performed in visit on 01/11/25   Streptococcus A Rapid Screen w/Reflex to PCR - Clinic Collect     Status: Normal    Specimen: Throat; Swab   Result Value Ref Range    Group A Strep antigen Negative Negative       I personally reviewed these results and discussed findings with the patient.    The use of Dragon/Entrada dictation services was used to construct the content of this note; any grammatical errors are non-intentional. Please contact the author directly if you are in need of any clarification.

## 2025-01-15 ENCOUNTER — PATIENT OUTREACH (OUTPATIENT)
Dept: CARE COORDINATION | Facility: CLINIC | Age: 40
End: 2025-01-15
Payer: COMMERCIAL

## 2025-02-27 SDOH — HEALTH STABILITY: PHYSICAL HEALTH: ON AVERAGE, HOW MANY MINUTES DO YOU ENGAGE IN EXERCISE AT THIS LEVEL?: 0 MIN

## 2025-02-27 SDOH — HEALTH STABILITY: PHYSICAL HEALTH: ON AVERAGE, HOW MANY DAYS PER WEEK DO YOU ENGAGE IN MODERATE TO STRENUOUS EXERCISE (LIKE A BRISK WALK)?: 0 DAYS

## 2025-02-27 ASSESSMENT — SOCIAL DETERMINANTS OF HEALTH (SDOH): HOW OFTEN DO YOU GET TOGETHER WITH FRIENDS OR RELATIVES?: ONCE A WEEK

## 2025-03-02 DIAGNOSIS — F41.1 GENERALIZED ANXIETY DISORDER: ICD-10-CM

## 2025-03-02 DIAGNOSIS — F32.1 MAJOR DEPRESSIVE DISORDER, SINGLE EPISODE, MODERATE (H): ICD-10-CM

## 2025-03-04 ENCOUNTER — OFFICE VISIT (OUTPATIENT)
Dept: FAMILY MEDICINE | Facility: CLINIC | Age: 40
End: 2025-03-04
Payer: COMMERCIAL

## 2025-03-04 VITALS
WEIGHT: 266.1 LBS | BODY MASS INDEX: 35.27 KG/M2 | HEART RATE: 111 BPM | SYSTOLIC BLOOD PRESSURE: 128 MMHG | RESPIRATION RATE: 12 BRPM | HEIGHT: 73 IN | TEMPERATURE: 97.9 F | OXYGEN SATURATION: 96 % | DIASTOLIC BLOOD PRESSURE: 76 MMHG

## 2025-03-04 DIAGNOSIS — F41.1 GENERALIZED ANXIETY DISORDER: ICD-10-CM

## 2025-03-04 DIAGNOSIS — E78.5 HYPERLIPIDEMIA LDL GOAL <100: ICD-10-CM

## 2025-03-04 DIAGNOSIS — Z00.00 ANNUAL PHYSICAL EXAM: Primary | ICD-10-CM

## 2025-03-04 DIAGNOSIS — E66.812 CLASS 2 OBESITY WITHOUT SERIOUS COMORBIDITY WITH BODY MASS INDEX (BMI) OF 35.0 TO 35.9 IN ADULT, UNSPECIFIED OBESITY TYPE: ICD-10-CM

## 2025-03-04 DIAGNOSIS — F32.1 MAJOR DEPRESSIVE DISORDER, SINGLE EPISODE, MODERATE (H): ICD-10-CM

## 2025-03-04 DIAGNOSIS — N52.9 ERECTILE DYSFUNCTION, UNSPECIFIED ERECTILE DYSFUNCTION TYPE: ICD-10-CM

## 2025-03-04 DIAGNOSIS — G47.33 OSA (OBSTRUCTIVE SLEEP APNEA): ICD-10-CM

## 2025-03-04 PROCEDURE — 99396 PREV VISIT EST AGE 40-64: CPT | Performed by: PHYSICIAN ASSISTANT

## 2025-03-04 PROCEDURE — 3078F DIAST BP <80 MM HG: CPT | Performed by: PHYSICIAN ASSISTANT

## 2025-03-04 PROCEDURE — 1126F AMNT PAIN NOTED NONE PRSNT: CPT | Performed by: PHYSICIAN ASSISTANT

## 2025-03-04 PROCEDURE — 3074F SYST BP LT 130 MM HG: CPT | Performed by: PHYSICIAN ASSISTANT

## 2025-03-04 PROCEDURE — 99213 OFFICE O/P EST LOW 20 MIN: CPT | Mod: 25 | Performed by: PHYSICIAN ASSISTANT

## 2025-03-04 RX ORDER — TADALAFIL 10 MG/1
10 TABLET ORAL DAILY
Qty: 90 TABLET | Refills: 3 | Status: SHIPPED | OUTPATIENT
Start: 2025-03-04

## 2025-03-04 RX ORDER — ESCITALOPRAM OXALATE 10 MG/1
TABLET ORAL
Qty: 30 TABLET | Refills: 0 | Status: SHIPPED | OUTPATIENT
Start: 2025-03-04 | End: 2025-04-11

## 2025-03-04 RX ORDER — ESCITALOPRAM OXALATE 10 MG/1
TABLET ORAL
Qty: 90 TABLET | Refills: 3 | Status: CANCELLED | OUTPATIENT
Start: 2025-03-04

## 2025-03-04 ASSESSMENT — PATIENT HEALTH QUESTIONNAIRE - PHQ9
SUM OF ALL RESPONSES TO PHQ QUESTIONS 1-9: 4
SUM OF ALL RESPONSES TO PHQ QUESTIONS 1-9: 4
10. IF YOU CHECKED OFF ANY PROBLEMS, HOW DIFFICULT HAVE THESE PROBLEMS MADE IT FOR YOU TO DO YOUR WORK, TAKE CARE OF THINGS AT HOME, OR GET ALONG WITH OTHER PEOPLE: NOT DIFFICULT AT ALL

## 2025-03-04 ASSESSMENT — PAIN SCALES - GENERAL: PAINLEVEL_OUTOF10: NO PAIN (0)

## 2025-03-04 NOTE — PROGRESS NOTES
Preventive Care Visit  [facility]  Bennett Cody PA-C, Family Medicine  Mar 4, 2025      Assessment & Plan     Annual physical exam  Overall doing well.  Will update screening labs.     Hyperlipidemia LDL goal <100  Since last visit he has started fish oil.  Last lipid panel mildly elevated.  He has not been watching his diet or staying active but he is planning on doing this to aid in weight loss and to help with his lipid panel.  He is not fasting so he will stop in the clinic later fasting to get this updated  - Lipid panel reflex to direct LDL Fasting; Future  Recent Labs   Lab Test 02/14/24  1549 01/10/23  1635   CHOL 194 227*   HDL 28* 29*   * 120*   TRIG 234* 389*     Generalized anxiety disorder  Major depressive disorder, single episode, moderate (H)  Erode currently stable.  Ran out of medications approximately 5 days ago and has not restarted it.  No withdrawal symptoms.  He would like to continue off of the medication as he is doing well.  Sent in a 30-day supply just in case he needs to restart in the near future due to withdrawal symptoms.  If he is needing to restart this due to withdrawal recommended taking every other day for 1 to 2 weeks    YANIRA (obstructive sleep apnea)  Compliant with CPAP    Class 2 obesity without serious comorbidity with body mass index (BMI) of 35.0 to 35.9 in adult, unspecified obesity type  BMI today is 35.  He has not been watching his diet or staying active.  We did discuss dietitian versus medications and he would like to start with seeing a dietitian.  We also discussed aerobic exercise and weight lifting which she is going to start doing.  Will check an A1c screening for diabetes.  If he is not seeing results with diet and exercise recommended following up in 3 to 6 weeks and we can discuss medications if needed  - Adult Nutrition  Referral; Future  - Hemoglobin A1c; Future    Erectile dysfunction, unspecified erectile  "dysfunction type  Currently getting to the tadalafil through an online pharmacy.  He would like me to refill this medication.  He is currently taking 7.5 mg daily.  Will prescribe 10 mg daily.  He has been taking the medication for some time without any side effects.  Side effects of the medication were discussed  - tadalafil (CIALIS) 10 MG tablet; Take 1 tablet (10 mg) by mouth daily.    Patient has been advised of split billing requirements and indicates understanding: Yes        BMI  Estimated body mass index is 35.59 kg/m  as calculated from the following:    Height as of this encounter: 1.842 m (6' 0.5\").    Weight as of this encounter: 120.7 kg (266 lb 1.6 oz).   Weight management plan: Discussed healthy diet and exercise guidelines    Counseling  Appropriate preventive services were addressed with this patient via screening, questionnaire, or discussion as appropriate for fall prevention, nutrition, physical activity, Tobacco-use cessation, social engagement, weight loss and cognition.  Checklist reviewing preventive services available has been given to the patient.  Reviewed patient's diet, addressing concerns and/or questions.         Subjective   Oli is a 39 year old, presenting for the following:  Physical (Annual Physical. No concerns. )        3/4/2025     3:14 PM   Additional Questions   Roomed by Kyara HUSSEIN CMA          Oli is a pleasant 39-year-old male presents to the clinic today for an annual physical.  Past medical history significant for anxiety, depression, hyperlipidemia obstructive sleep apnea, and obesity.  Overall doing well.    Increased stress is him and his wife are currently going through divorce.  Feels that his mood is stable.  He actually discontinued Lexapro 5 days ago as he ran out.  He is not having any side effects.  He is not noticing any mood changes since he has been off of the Lexapro.    He is interested in seeing a dietitian to help with weight loss.        Advance " Care Planning  Patient does not have a Health Care Directive: Discussed advance care planning with patient; however, patient declined at this time.      2/27/2025   General Health   How would you rate your overall physical health? Good   Feel stress (tense, anxious, or unable to sleep) Only a little   (!) STRESS CONCERN      2/27/2025   Nutrition   Three or more servings of calcium each day? (!) NO   Diet: Regular (no restrictions)   How many servings of fruit and vegetables per day? (!) 0-1   How many sweetened beverages each day? 0-1         2/27/2025   Exercise   Days per week of moderate/strenous exercise 0 days   Average minutes spent exercising at this level 0 min   (!) EXERCISE CONCERN      2/27/2025   Social Factors   Frequency of gathering with friends or relatives Once a week   Worry food won't last until get money to buy more No   Food not last or not have enough money for food? No   Do you have housing? (Housing is defined as stable permanent housing and does not include staying ouside in a car, in a tent, in an abandoned building, in an overnight shelter, or couch-surfing.) No   Are you worried about losing your housing? No   Lack of transportation? No   Unable to get utilities (heat,electricity)? No   Want help with housing or utility concern? No   (!) HOUSING CONCERN PRESENT      2/27/2025   Dental   Dentist two times every year? Yes         1/30/2025   TB Screening   Were you born outside of the [place]? No       Today's PHQ-9 Score:       3/4/2025     3:08 PM   PHQ-9 SCORE   PHQ-9 Total Score MyChart 4 (Minimal depression)   PHQ-9 Total Score 4        Patient-reported         2/27/2025   Substance Use   Alcohol more than 3/day or more than 7/wk Not Applicable   Do you use any other substances recreationally? No     Social History     Tobacco Use    Smoking status: Never     Passive exposure: Never    Smokeless tobacco: Never   Vaping Use    Vaping status: Never Used   Substance Use Topics    Alcohol use:  Not Currently    Drug use: Not Currently             2/27/2025   One time HIV Screening   Previous HIV test? No         2/27/2025   STI Screening   New sexual partner(s) since last STI/HIV test? (!) YES    ASCVD Risk   The 10-year ASCVD risk score (Susan MCGARRY, et al., 2019) is: 2.4%    Values used to calculate the score:      Age: 39 years      Sex: Male      Is Non- : No      Diabetic: No      Tobacco smoker: No      Systolic Blood Pressure: 128 mmHg      Is BP treated: No      HDL Cholesterol: 28 mg/dL      Total Cholesterol: 194 mg/dL       Reviewed and updated as needed this visit by Provider                    No past medical history on file.  Past Surgical History:   Procedure Laterality Date    BIOPSY  2023    Mole on lower back    GENITOURINARY SURGERY  2020    vasectomy     BP Readings from Last 3 Encounters:   03/04/25 128/76   01/11/25 114/78   03/18/24 108/70    Wt Readings from Last 3 Encounters:   03/04/25 120.7 kg (266 lb 1.6 oz)   07/03/24 109.8 kg (242 lb)   03/18/24 116.1 kg (256 lb)                  Patient Active Problem List   Diagnosis    Lichen planus    Major depression in complete remission    Generalized anxiety disorder    Allergic rhinitis due to pollen    Chronic sinusitis    Metabolic syndrome    Class 1 obesity due to excess calories with serious comorbidity and body mass index (BMI) of 33.0 to 33.9 in adult     Past Surgical History:   Procedure Laterality Date    BIOPSY  2023    Mole on lower back    GENITOURINARY SURGERY  2020    vasectomy       Social History     Tobacco Use    Smoking status: Never     Passive exposure: Never    Smokeless tobacco: Never   Substance Use Topics    Alcohol use: Not Currently     Family History   Problem Relation Age of Onset    Asthma Mother     Depression Mother     Anxiety Disorder Mother     Alcoholism Mother     Other Cancer Mother         Colon Cancer    Myelodysplastic syndrome Father         d age 69    Other  "Cancer Father         Myelofibrosis    Hypertension Brother     Depression Brother     Anxiety Disorder Brother     Mental Illness Brother     Substance Abuse Brother     No Known Problems Daughter     No Known Problems Son          Current Outpatient Medications   Medication Sig Dispense Refill    tadalafil (CIALIS) 10 MG tablet Take 1 tablet (10 mg) by mouth daily. 90 tablet 3    escitalopram (LEXAPRO) 10 MG tablet TAKE 1 TABLET BY MOUTH EVERY DAY 30 tablet 0     Recent Labs   Lab Test 02/14/24  1549 01/10/23  1635 10/29/20  1249 10/15/20  1255 10/15/20  1255 10/10/20  1908   A1C 5.3 5.4  --   --   --   --    * 120*  --   --   --   --    HDL 28* 29*  --   --   --   --    TRIG 234* 389*  --   --   --   --    ALT 62 91*  --   --   --  43   CR 1.07 0.99 0.92   < > 0.97 1.07   GFRESTIMATED >90 >90 >90   < > >90 >60   GFRESTBLACK  --   --  >90  --  >90 >60   POTASSIUM 4.3 4.3 4.1   < > 4.0 4.0   TSH  --   --   --   --   --  1.31    < > = values in this interval not displayed.          Review of Systems  Constitutional, HEENT, cardiovascular, pulmonary, GI, , musculoskeletal, neuro, skin, endocrine and psych systems are negative, except as otherwise noted.     Objective    Exam  /76 (BP Location: Left leg, Patient Position: Sitting, Cuff Size: Adult Regular)   Pulse 111   Temp 97.9  F (36.6  C) (Oral)   Resp 12   Ht 1.842 m (6' 0.5\")   Wt 120.7 kg (266 lb 1.6 oz)   SpO2 96%   BMI 35.59 kg/m     Estimated body mass index is 35.59 kg/m  as calculated from the following:    Height as of this encounter: 1.842 m (6' 0.5\").    Weight as of this encounter: 120.7 kg (266 lb 1.6 oz).    Physical Exam  Vitals and nursing note reviewed.   Constitutional:       General: He is not in acute distress.     Appearance: Normal appearance. He is well-developed and well-groomed. He is not ill-appearing or toxic-appearing.   HENT:      Head: Normocephalic and atraumatic.      Right Ear: Tympanic membrane, ear canal and " external ear normal.      Left Ear: Tympanic membrane, ear canal and external ear normal.      Mouth/Throat:      Lips: Pink.      Mouth: Mucous membranes are moist.      Palate: No mass.      Pharynx: Oropharynx is clear. Uvula midline.      Tonsils: No tonsillar exudate or tonsillar abscesses.   Eyes:      General: Lids are normal.         Right eye: No discharge.         Left eye: No discharge.   Neck:      Trachea: Trachea normal.   Cardiovascular:      Rate and Rhythm: Normal rate and regular rhythm.      Heart sounds: S1 normal and S2 normal. No murmur heard.  Pulmonary:      Effort: Pulmonary effort is normal.      Breath sounds: Normal breath sounds and air entry.   Abdominal:      General: Bowel sounds are normal. There is no distension.      Palpations: Abdomen is soft.      Tenderness: There is no abdominal tenderness. There is no guarding or rebound.   Musculoskeletal:      Cervical back: Full passive range of motion without pain and neck supple.      Right lower leg: No edema.      Left lower leg: No edema.   Lymphadenopathy:      Cervical: No cervical adenopathy.   Skin:     General: Skin is warm and dry.      Findings: No lesion or rash.   Neurological:      General: No focal deficit present.      Mental Status: He is alert.      Cranial Nerves: No cranial nerve deficit.      Gait: Gait is intact.      Deep Tendon Reflexes:      Reflex Scores:       Patellar reflexes are 2+ on the right side and 2+ on the left side.  Psychiatric:         Attention and Perception: Attention normal.         Mood and Affect: Mood normal.         Speech: Speech normal.           Signed Electronically by: Bennett Cody PA-C    Answers submitted by the patient for this visit:  Patient Health Questionnaire (Submitted on 3/4/2025)  If you checked off any problems, how difficult have these problems made it for you to do your work, take care of things at home, or get along with other people?: Not difficult at all  PHQ9  TOTAL SCORE: 4

## 2025-03-12 ENCOUNTER — MYC MEDICAL ADVICE (OUTPATIENT)
Dept: FAMILY MEDICINE | Facility: CLINIC | Age: 40
End: 2025-03-12
Payer: COMMERCIAL

## 2025-03-12 DIAGNOSIS — N52.9 ERECTILE DYSFUNCTION, UNSPECIFIED ERECTILE DYSFUNCTION TYPE: ICD-10-CM

## 2025-03-12 DIAGNOSIS — Z30.2 ENCOUNTER FOR VASECTOMY: Primary | ICD-10-CM

## 2025-03-12 RX ORDER — TADALAFIL 10 MG/1
10 TABLET ORAL EVERY 24 HOURS
Qty: 90 TABLET | Refills: 3 | Status: SHIPPED | OUTPATIENT
Start: 2025-03-12

## 2025-04-01 ENCOUNTER — LAB (OUTPATIENT)
Dept: LAB | Facility: CLINIC | Age: 40
End: 2025-04-01
Payer: COMMERCIAL

## 2025-04-01 DIAGNOSIS — E66.812 CLASS 2 OBESITY WITHOUT SERIOUS COMORBIDITY WITH BODY MASS INDEX (BMI) OF 35.0 TO 35.9 IN ADULT, UNSPECIFIED OBESITY TYPE: ICD-10-CM

## 2025-04-01 DIAGNOSIS — E78.5 HYPERLIPIDEMIA LDL GOAL <100: ICD-10-CM

## 2025-04-01 LAB
ALBUMIN SERPL BCG-MCNC: 4.3 G/DL (ref 3.5–5.2)
ALP SERPL-CCNC: 62 U/L (ref 40–150)
ALT SERPL W P-5'-P-CCNC: 33 U/L (ref 0–70)
ANION GAP SERPL CALCULATED.3IONS-SCNC: 11 MMOL/L (ref 7–15)
AST SERPL W P-5'-P-CCNC: 22 U/L (ref 0–45)
BILIRUB SERPL-MCNC: 0.3 MG/DL
BUN SERPL-MCNC: 12 MG/DL (ref 6–20)
CALCIUM SERPL-MCNC: 9.5 MG/DL (ref 8.8–10.4)
CHLORIDE SERPL-SCNC: 103 MMOL/L (ref 98–107)
CHOLEST SERPL-MCNC: 169 MG/DL
CREAT SERPL-MCNC: 1 MG/DL (ref 0.67–1.17)
EGFRCR SERPLBLD CKD-EPI 2021: >90 ML/MIN/1.73M2
ERYTHROCYTE [DISTWIDTH] IN BLOOD BY AUTOMATED COUNT: 12.3 % (ref 10–15)
EST. AVERAGE GLUCOSE BLD GHB EST-MCNC: 105 MG/DL
FASTING STATUS PATIENT QL REPORTED: YES
FASTING STATUS PATIENT QL REPORTED: YES
GLUCOSE SERPL-MCNC: 104 MG/DL (ref 70–99)
HBA1C MFR BLD: 5.3 % (ref 0–5.6)
HCO3 SERPL-SCNC: 25 MMOL/L (ref 22–29)
HCT VFR BLD AUTO: 46.3 % (ref 40–53)
HDLC SERPL-MCNC: 27 MG/DL
HGB BLD-MCNC: 16.3 G/DL (ref 13.3–17.7)
LDLC SERPL CALC-MCNC: 111 MG/DL
MCH RBC QN AUTO: 29.5 PG (ref 26.5–33)
MCHC RBC AUTO-ENTMCNC: 35.2 G/DL (ref 31.5–36.5)
MCV RBC AUTO: 84 FL (ref 78–100)
NONHDLC SERPL-MCNC: 142 MG/DL
PLATELET # BLD AUTO: 172 10E3/UL (ref 150–450)
POTASSIUM SERPL-SCNC: 4.1 MMOL/L (ref 3.4–5.3)
PROT SERPL-MCNC: 6.8 G/DL (ref 6.4–8.3)
RBC # BLD AUTO: 5.52 10E6/UL (ref 4.4–5.9)
SODIUM SERPL-SCNC: 139 MMOL/L (ref 135–145)
TRIGL SERPL-MCNC: 157 MG/DL
WBC # BLD AUTO: 6.4 10E3/UL (ref 4–11)

## 2025-04-01 PROCEDURE — 85027 COMPLETE CBC AUTOMATED: CPT

## 2025-04-01 PROCEDURE — 80053 COMPREHEN METABOLIC PANEL: CPT

## 2025-04-01 PROCEDURE — 80061 LIPID PANEL: CPT

## 2025-04-01 PROCEDURE — 83036 HEMOGLOBIN GLYCOSYLATED A1C: CPT

## 2025-04-01 PROCEDURE — 36415 COLL VENOUS BLD VENIPUNCTURE: CPT

## 2025-04-03 DIAGNOSIS — F41.1 GENERALIZED ANXIETY DISORDER: ICD-10-CM

## 2025-04-03 DIAGNOSIS — F32.1 MAJOR DEPRESSIVE DISORDER, SINGLE EPISODE, MODERATE (H): ICD-10-CM

## 2025-04-03 RX ORDER — ESCITALOPRAM OXALATE 10 MG/1
10 TABLET ORAL
Qty: 30 TABLET | Refills: 0 | OUTPATIENT
Start: 2025-04-03

## 2025-04-03 NOTE — TELEPHONE ENCOUNTER
Spoke with the patient to get an update. He states he is doing well and is not having any withdrawal symptoms at this time. He does not need a refill of medications at this time. Routing to PCP for an FYI and review.    Sinan Cortez RN  [facility]

## 2025-04-03 NOTE — TELEPHONE ENCOUNTER
"Per office visit note with PCP on 3/4/25,  \"Major depressive disorder, single episode, moderate (H)  Erode currently stable.  Ran out of medications approximately 5 days ago and has not restarted it.  No withdrawal symptoms.  He would like to continue off of the medication as he is doing well.  Sent in a 30-day supply just in case he needs to restart in the near future due to withdrawal symptoms.  If he is needing to restart this due to withdrawal recommended taking every other day for 1 to 2 weeks\"    Routing to RN pool to contact patient to follow up on how patient is doing off of medication and if any refills are needed.    INEZ BoyleN, RN  [facility]    "

## 2025-04-28 ENCOUNTER — LAB (OUTPATIENT)
Dept: LAB | Facility: CLINIC | Age: 40
End: 2025-04-28
Payer: COMMERCIAL

## 2025-04-28 DIAGNOSIS — Z30.2 ENCOUNTER FOR VASECTOMY: ICD-10-CM

## 2025-04-28 LAB — SEMEN ANALYSIS P VAS PNL: NORMAL

## 2025-04-28 PROCEDURE — 89321 SEMEN ANAL SPERM DETECTION: CPT

## 2025-05-12 ENCOUNTER — MYC MEDICAL ADVICE (OUTPATIENT)
Dept: FAMILY MEDICINE | Facility: CLINIC | Age: 40
End: 2025-05-12
Payer: COMMERCIAL

## 2025-05-12 DIAGNOSIS — N52.9 ERECTILE DYSFUNCTION, UNSPECIFIED ERECTILE DYSFUNCTION TYPE: ICD-10-CM

## 2025-05-12 RX ORDER — TADALAFIL 10 MG/1
10 TABLET ORAL DAILY
Qty: 90 TABLET | Refills: 3 | Status: SHIPPED | OUTPATIENT
Start: 2025-05-12

## 2025-05-12 NOTE — TELEPHONE ENCOUNTER
"Per fax that was attached to [employer] msg, Providers are to electronically submit prescriptions to \" [facility]\"    Meds pended to electronically send.    Liz Murphy MA on 5/12/2025 at 2:29 PM    "

## 2025-05-20 ENCOUNTER — OFFICE VISIT (OUTPATIENT)
Dept: URGENT CARE | Facility: URGENT CARE | Age: 40
End: 2025-05-20
Payer: COMMERCIAL

## 2025-05-20 ENCOUNTER — TELEPHONE (OUTPATIENT)
Dept: RESEARCH | Facility: CLINIC | Age: 40
End: 2025-05-20

## 2025-05-20 VITALS
HEART RATE: 91 BPM | TEMPERATURE: 97.7 F | RESPIRATION RATE: 16 BRPM | BODY MASS INDEX: 34.67 KG/M2 | SYSTOLIC BLOOD PRESSURE: 123 MMHG | WEIGHT: 256 LBS | OXYGEN SATURATION: 95 % | HEIGHT: 72 IN | DIASTOLIC BLOOD PRESSURE: 88 MMHG

## 2025-05-20 DIAGNOSIS — H60.392 INFECTIVE OTITIS EXTERNA, LEFT: Primary | ICD-10-CM

## 2025-05-20 PROCEDURE — 3079F DIAST BP 80-89 MM HG: CPT | Performed by: NURSE PRACTITIONER

## 2025-05-20 PROCEDURE — 3074F SYST BP LT 130 MM HG: CPT | Performed by: NURSE PRACTITIONER

## 2025-05-20 PROCEDURE — 99213 OFFICE O/P EST LOW 20 MIN: CPT | Performed by: NURSE PRACTITIONER

## 2025-05-20 RX ORDER — OFLOXACIN 3 MG/ML
5 SOLUTION AURICULAR (OTIC) DAILY
Qty: 10 ML | Refills: 0 | Status: SHIPPED | OUTPATIENT
Start: 2025-05-20 | End: 2025-05-27

## 2025-05-20 NOTE — PROGRESS NOTES
Assessment & Plan      Diagnosis Comments   1. Infective otitis externa, left  ofloxacin (FLOXIN) 0.3 % otic solution Home care reviewed. Patient verbalized understanding; will monitor symptoms closely. Reviewed s/e to medications.   Follow up with primary care in 1 week if symptoms not improving.     Handout given from epic and reviewed.              VANESSA Hickman CNP  [facility] [place]    Katie Baird is a 39 year old male who presents to clinic today for the following health issues:  Chief Complaint   Patient presents with    Urgent Care    Otalgia     X3 days of pain in left ear   Today started to feel clogged         5/20/2025    10:52 AM   Additional Questions   Roomed by christopher mendoza     HPI      URI Adult    Onset of symptoms was 3 day(s) ago.  Course of illness is worsening.    Severity moderate  Current and Associated symptoms: ear pain left  Treatment measures tried include Tylenol/Ibuprofen, Fluids, and Rest.  Predisposing factors include None.      Review of Systems  Constitutional, HEENT, cardiovascular, pulmonary, gi and gu systems are negative, except as otherwise noted.      Objective    /88   Pulse 91   Temp 97.7  F (36.5  C) (Temporal)   Resp 16   Ht 1.829 m (6')   Wt 116.1 kg (256 lb)   SpO2 95%   BMI 34.72 kg/m    Physical Exam   GENERAL: alert and no distress  EYES: Eyes grossly normal to inspection, PERRL and conjunctivae and sclerae normal  HENT: normal cephalic/atraumatic, right ear: normal: no effusions, no erythema, normal landmarks, left ear: purulent drainage in canal and red and boggy canal, nose and mouth without ulcers or lesions, oropharynx clear, and oral mucous membranes moist  NECK: bilateral anterior cervical adenopathy, no asymmetry, masses, or scars, and thyroid normal to palpation  RESP: lungs clear to auscultation - no rales, rhonchi or wheezes  CV: regular rate and rhythm, normal S1 S2, no S3 or S4, no murmur, click  or rub, no peripheral edema  MS: no gross musculoskeletal defects noted, no edema  SKIN: no suspicious lesions or rashes

## 2025-05-20 NOTE — TELEPHONE ENCOUNTER
NP called patient at 4:07 pm on 5/20/2025 left message about obtaining colonoscopy report when he was 24 years old and NP would like patient to get a copy of his colonoscopy report from the gastroenterologist. Patient was recommended to get a second colonoscopy and if this was completed to also bring colonoscopy report to the appointment too.

## 2025-05-20 NOTE — PROGRESS NOTES
Urgent Care Clinic Visit    Chief Complaint   Patient presents with    Urgent Care    Otalgia     X3 days of pain in left ear   Today started to feel clogged               5/20/2025    10:52 AM   Additional Questions   Roomed by christopher mendoza

## 2025-05-21 ENCOUNTER — TELEPHONE (OUTPATIENT)
Dept: RESEARCH | Facility: CLINIC | Age: 40
End: 2025-05-21
Payer: COMMERCIAL

## 2025-05-21 NOTE — TELEPHONE ENCOUNTER
NP called patient at 15:37 pm on 5/21/2025 left message about obtaining colonoscopy report when he was 24 years old and NP would like patient to get a copy of his colonoscopy report from the gastroenterologist. Patient was recommended to get a second colonoscopy and if this was completed to also bring colonoscopy report to the appointment too.

## 2025-05-28 ENCOUNTER — ALLIED HEALTH/NURSE VISIT (OUTPATIENT)
Dept: RESEARCH | Facility: CLINIC | Age: 40
End: 2025-05-28
Payer: COMMERCIAL

## 2025-05-28 VITALS
OXYGEN SATURATION: 94 % | RESPIRATION RATE: 20 BRPM | HEIGHT: 72 IN | WEIGHT: 256 LBS | DIASTOLIC BLOOD PRESSURE: 88 MMHG | BODY MASS INDEX: 34.67 KG/M2 | HEART RATE: 93 BPM | SYSTOLIC BLOOD PRESSURE: 122 MMHG | TEMPERATURE: 98.1 F

## 2025-05-28 DIAGNOSIS — Z00.6 EXAMINATION OF PARTICIPANT OR CONTROL IN CLINICAL RESEARCH: Primary | ICD-10-CM

## 2025-05-28 PROCEDURE — 99207 PR NO CHARGE-RESEARCH SERVICE: CPT

## 2025-05-28 PROCEDURE — 3074F SYST BP LT 130 MM HG: CPT

## 2025-05-28 PROCEDURE — 3079F DIAST BP 80-89 MM HG: CPT

## 2025-05-28 NOTE — PROGRESS NOTES
[place] Inclusion/Exclusion Criteria:    Study Name: [place] (-[id number])      : Jenniffer Nassar MD      Study Description: The purpose of this study is to explore potential relationships between physiologic parameters collected from sensor data with physiological changes potentially induced by the administration of the COVID-19 vaccine.     Protocol Version: 6.0 (Version Date: 2-APR-2025) Consent Version: 6.0 (Version Date: 3-APR-2025)  The protocol and consent form were consulted to make inclusion and exclusion decisions along with the ana dated 27-February-2025 that addresses all [place] Phase 1.0 nuances and provides further clarification for some inclusion/exclusion criteria.     Inclusion #  Inclusion Criteria (ALL MUST BE YES)  YES/NO/N/A   1 Be at least 18 years old  Yes   2 Proficient in written and spoken English, defined by self-report   Yes   3 Willing and able to participate in the study procedures and data consent described in the consent form   Yes   4 Able to communicate effectively with and follow instructions from the Study Team    Yes   5   Eligible to receive the updated COVID-19 vaccine based on current CDC recommendations and vaccine prescribing information. (As determined by Sub-I) Yes   6   Able to disclose home address to a healthcare provider or Study Team member to enable (a) device shipping (if necessary) and (b) a 911 call in case of potential emergency (home address will not be kept as study data)  Yes   7    Able to adhere to Lifestyle Considerations (see Section 5.3) throughout study duration (as applicable). These include avoiding taking certain over-the-counter pain relievers or fever reducing medications around the time of vaccination, not taking any recreational drugs (e.g. methamphetamines, cocaine, opioids, cannabis, LSD)  within 72 hours prior to, during and after the ingestible temperature sensor monitoring period; and abstaining from strenuous  "exercise, ingestion of hot or cold liquids, eating food, chewing gum or mints, brushing teeth or smoking 30 minutes before taking oral temperature measurements.  Yes   8   Participant has their own reliable high-speed broadband internet at their home and active at the time of data collection  Yes   9   Have a personal computer, desktop, laptop, tablet, or smartphone with audiovisual capabilities Yes   If any inclusion criteria marked \"No\" please provide detail (If all Yes, N/A): N/A        Exclusion # Exclusion Criteria (ALL MUST BE NO) YES/NO/N/A   1 Participants with tattoos, skin problems or wound(s) on/in the wrist or deltoid (ex: injured or friable skin, skin disorders, or allergic skin reactions, such as eczema, rosacea, impetigo, dermatomyositis, or allergic contact dermatitis), that can interfere with study setup/assessments/vaccination  No   2 Individuals who are pregnant or plan to become pregnant during the study  No   3 Anything that may interfere with proper physiological data acquisition, such as an implantable device (e.g., cardiac pacemaker, automated implantable cardioverter-defibrillator, deep brain stimulator, Inspire upper airway stimulation device) and casts or body braces No   4 Participants that are diagnosed or are suspected to have illnesses affecting motion: e.g., Parkinson's, Essential Tremor, Dystonia, or others at investigator's discretion No   5 Participants that are diagnosed with a condition or taking a medication that impairs the immune system (i.e., active cancer, HIV/AIDS, organ/stem cell transplant recipient, autoimmune disorders, primary immunodeficiencies) No   6 Participants with any medical history, vital sign, or any other study procedure finding/assessment that in the opinion of the investigator could compromise participant safety during study participation or interfere with the study integrity and/or the accurate assessment of the study objectives No   7 Daily use of OTC or " "prescription medications with antipyretic properties at time of enrollment that is anticipated to continue during the CBT sensor data collection period surrounding administration of vaccines. Low dose aspirin (81 mg or less per day) taken for preventative purposes is permissible No   8 Individuals who are unwilling to avoid taking OTC pain relievers and anti-pyrectics for acute mild to moderate pain and fever associated with vaccine administration during the data collection days surrounding its administration No   9 Participant works for a company that develops or sells medical and/or fitness devices (e.g., ECG monitors, wearable fitness bands, sleep monitors, etc.) or are technology journalists (e.g., professional bloggers, TV, magazine, newspaper reporters, etc.) No   10 Overnight travel or travel between time zones planned during CBT sensor data collection nights No   11 Participants with planned overnight travel totaling >= 7 nights during duration of study data collection period No   12 Participant plans on moving or changing address within the study period No   13 Participant is employed in overnight shift work, or otherwise does not maintain a reasonably consistent day/night schedule (e.g., participants who are unable to regularly go to bed between 7pm to 2am and wake up between 4am to 12pm on average >= 3 times a week) No   14 Participants with clinically relevant sleep disturbances and unable to achieve at least 4 hours of continuous sleep on average each night No   If any exclusion criteria marked \"Yes\" please provide detail (If all No, N/A): N/A    Exclusion (a) # Exclusion criteria related to the COVID-19 vaccine:   (ALL MUST BE NO) YES/NO/N/A   1 Participants with a known history of a severe allergic reaction (e.g., anaphylaxis) to any component of the COVID-19 vaccine. No   2 Participants who experienced severe side effects following previous administration of the COVID-19 vaccine including " "myocarditis, pericarditis, thrombosis, or thrombocytopenia No   3 Participants in whom an additional COVID-19 vaccine is contraindicated. No   If any exclusion criteria marked \"Yes\" please provide detail (If all No, N/A): N/A    Exclusion (b) # Exclusion criteria related to Ingestible Temperature Sensor:   (ALL MUST BE NO) YES/NO/N/A   1 Participants under the age of 18 No   2 Participant weighs less than 40 kg (88 lbs.) or BMI greater than 44.6 No   3 Participants who are pregnant No   4 Participants with a known diagnosis of obstructive disease of the gastrointestinal tract or a known hernia, Crohn's disease, diverticulitis, or other inflammatory bowel disease. No   5 Participants with a 1st degree relative with any inflammatory bowel disease with suspected hereditary transmission (e.g., Crohn's disease, or ulcerative colitis) No   6 Participants with known history of disordered or impaired gag reflex  No   7 Participants who have problems swallowing food or pills (e.g., dysphagia) No   8 Participants with previous gastrointestinal tract surgery involving the esophagus, stomach, or intestines, excluding intraluminal endoscopy. No   9 Participants with known diagnosis of hypo-motility disorders of the gastrointestinal tract (including chronic constipation with fewer than three spontaneous bowel movements per week No   10 Participants with chronic diarrhea, as defined by 3 or more episodes of diarrhea per week for the last 30 days or >= 3 bowel movements per day No   11 Participants with known diagnosis of felinization of the esophagus (unusual folding of the esophagus)  No   12 Participants with Zenker's diverticulum (a pouch that forms in the upper part of the esophagus) and people with a history of other diverticula.  No   13 Participants who may undergo NMR or MRI scanning within one week of CBT sensor ingestion No   14 Participants with an implantable pulse generator or implantable electro-medical device of any " "kind (e.g., pacemakers (or implantable pulse generators), implantable cardioverter defibrillators (ICDs), deep brain stimulation (DBS) devices, and left ventricular assist devices (LVADs). No   15 Participants with an implanted or temporarily implanted device that uses an external power-source. No   If any exclusion criteria marked \"Yes\" please provide detail (If all No, N/A): N/A    Will the participant continue in the study? Yes  (If \"No\", follow instructions for handling of Screen Failures)    If the participant is eligible to continue in the study, inclusion/exclusion criteria above will be sent to the PI for co-sign.    Enrollment Date: 28-MAY-2025      MD El Duran    "

## 2025-05-28 NOTE — PROGRESS NOTES
[place] Study Consent    Study Description: The purpose of this study is to explore potential relationships between physiologic parameters collected from sensor data with physiological changes potentially induced by the administration of the COVID-19 vaccine.    Oli Gage a 39 year old male, was on-site today at [facility] to discuss participation for [place] (-[id number])       The consent form was reviewed with the patient.     The review of the study included:  Study Purpose      Participant Duration, Responsibilities & Expectations    Study Data and Devices    Benefits and Risks of Participation    Compensation and Costs of Participation    Coded Study Data  Voluntary Participation    Study Restrictions  Confidentiality Obligations/Privacy-Related Risks   Injury, Legal, and Data Rights    Authorization to Use and Disclose Your Protected Health Information    Protocol Version: 6.0   Principle Investigator: Jenniffer Nassar MD    Subject Number: [id number]      The subject was queried in regards to his willingness to continue and his questions were answered to his satisfaction. The patient has given his agreement to volunteer and participate in the above noted study.     The eConsent and HIPAA form version (Version 6.0 Date 03-Apr-2025) was signed on  28-MAY-2025 with the [facility] of [facility].     A copy of the [place] consent will be placed in subject's medical record. A copy of the consent form was provided to the subject today.    Study data is directly entered into Epic and [employer] per protocol. No study procedures were done prior to Oli Gage providing informed consent.       28-MAY-2025    El Holbrook

## 2025-05-28 NOTE — PROGRESS NOTES
[place] Screening Study Note  Study Description: The purpose of this study is to explore potential relationships between physiologic parameters collected from sensor data with physiological changes potentially induced by the administration of the COVID-19 vaccine.       Subject ID:      Demographic Info  Oli Gage   1985          39 year old    SCREENING   Sex: Male    Multi Racial?: No; Primary: White   Ethnicity: Not  or      MEDICAL HISTORY REVIEW  Medical history, medications, allergies, surgical history and familial medical history were reviewed with the participant and verified by chart review.     Medical Conditions:   The table below represents their relevant medical history.   Has the subject experienced any relevant past and/or concomitant Medical History (e.g., chronic conditions such as hypertension, cardiovascular disease, stroke, diabetes, kidney disease, peripheral arterial disease, Raynaud's syndrome? No, no relevant medical history to report.   No past medical history on file.        Any History of...  If any of the below are yes, the subject is a screen fail.  -Divertic___ (diverticulosis, diverticula, diverticulitis, etc.)? No  -Rheumatoid arthritis? No  Lupus? No  Hernia? (Other than inguinal or childhood umbilical)  No  Peptic Ulcer? No  Crohn's Disease? No  In any 1st degree family members? No  Colitis? No  Dysphagia? No  Parkinson s? No  Essential Tremor? No      Concomitant Medications:   The table below represents their current prescription, OTC, and supplement medications they are taking on a regular basis/have taken in the last 30 days.      Patient is not currently taking any medications      Allergies:   Does the subject have any known allergies to medications, food, a vaccine component, or latex? Yes  *If the participant has an allergy to something in not one of these 3 categories, include them in the medical history.*  Allergies   Allergen  Reactions    Phenytoin Rash        Surgical History  Any history of gastrointestinal tract surgery involving the esophagus, stomach, or intestines? No  If there are no GI surgeries, delete surgery smartlist section.     Past Surgical History:   Procedure Laterality Date    BIOPSY  2023    GENITOURINARY SURGERY  2020       Family Medical History  No first degree relative has a history of any inflammatory bowel disease with suspected hereditary transmission (eg. Crohn's, ulcerative colitis, etc)     Family History   Problem Relation Age of Onset    Asthma Mother     Depression Mother     Anxiety Disorder Mother     Alcoholism Mother     Other Cancer Mother         Colon Cancer    Myelodysplastic syndrome Father         d age 69    Other Cancer Father         Myelofibrosis    Hypertension Brother     Depression Brother     Anxiety Disorder Brother     Mental Illness Brother     Substance Abuse Brother     No Known Problems Daughter     No Known Problems Son        Subject Characteristics     Vitals  /88 (BP Location: Left arm, Patient Position: Sitting, Cuff Size: Adult Large)   Pulse 93   Temp 98.1  F (36.7  C) (Oral)   Resp 20   Ht 1.829 m (6')   Wt 116.1 kg (256 lb)   SpO2 94%   BMI 34.72 kg/m         Rod Scale:  Wrist Circumference: Study Watch Wrist Preferred Watch Wrist Dominant Hand Watch Band Tightness:   3 20 cm Right Right Right Secure      Watch Size Preference: 45mm    ENROLLMENT    Was the visit performed? Yes   Date of Enrollment: 28-MAY-2025. All procedures below occurred on this day.    Site Zip Code: Site Time Zone:  Site Location: Protocol Assigned: Eligibility Confirmed:   [address] [place] [facility] Protocol A (COVID) Yes   Plan for Study Kit #1 Delivery: Given to Participant On-Site     [place] Device Accountability Prepped/Dispensed  Prepped & Dispensed Study Kit #1:  All Study Devices included? Yes (including Study Watch, Study Phone, Ambient Sensor, Oral Thermometer and Charging  Accessories)  Is this a Replacement Kit? No    Study Phone  ID [employer] ID [place] ID    [id number]     Study Watch  ID Model  Band Type    [id number] Series 9 Sport Loop     Was Study Kit #1 Shipped to the Participant? No  Were all expected devices received? Yes  Were there Device Issues? No  Was the Study Kit Replaced? No    All devices listed above were dispensed to the participant. Device ID were confirmed prior to dispensation.      Participant was thoroughly educated on study procedure and device care, staff highlighted the importance of compliance to study procedure. All questions and concerns were addressed, and informed participant to contact study coordinators for any questions. Subject was provided with a copy of the subject instructions for at home review.     Adverse Events Summary:*   Were any Adverse Events (AEs) experienced? No    Protocol Deviation Summary:*   Were there any Protocol Deviations?: No    *If Yes, please complete corresponding form.    Concomitant Medications:  As screening and enrollment appointments occurred on the same day, please refer to the concomitant medications documented above.        28-MAY-2025   El Holbrook

## 2025-05-28 NOTE — PROGRESS NOTES
[place] Study Physical Exam  Study Description: The purpose of this study is to explore potential relationships between physiologic parameters collected from sensor data with physiological changes potentially induced by the administration of the COVID-19 vaccine.     Medical History Reviewed? Yes  After extensive review of the entire available medical record, to the best of my knowledge there is no reason to exclude this patient from the study.          Medical Decision Making (include details from chart review, discussion with Dr. VERGARA, etc): N/A    Physical Examination  For abnormal findings, please evaluate if the finding is Clinically Significant (by 'CS') or Not Clinically Significant (by 'NCS')  General Appearance   Normal  Head and Neck   Normal  Lungs     Normal  Cardiovascular   Normal   Do they have a stimulator/Pacemaker? No  Gastrointestinal   Normal   Problems swallowing medication? No  ANY history of diverticula (diverticulosis, diverticulitis, etc): No  Any history of GI surgery? No  Bowel habits: daily   Regular laxative use? No  Musculoskeletal/Extremities Normal   Lymph Nodes    Normal  Skin     Normal     Any Tattoos or Skin issues on the wrists or deltoid? No  Neurological    Normal   Any sleep disturbances? (Must get at least 5 hours a night) No Patient sleeps 6 to 8 hours a night   Memory issues?  No  Tremor (If present document)  Absent  Any balance issues or recent falls?     No    Vitals:    05/28/25 0920   BP: 122/88   BP Location: Left arm   Patient Position: Sitting   Cuff Size: Adult Large   Pulse: 93   Resp: 20   Temp: 98.1  F (36.7  C)   TempSrc: Oral   SpO2: 94%   Weight: 116.1 kg (256 lb)   Height: 1.829 m (6')             Immunization History   Administered Date(s) Administered    COVID-19 Monovalent 18+ (Moderna) 02/01/2021, 03/01/2021    DTAP (<7y) 1984, 01/01/1985, 03/01/1985, 04/01/1986, 10/01/1989    Hep B, Adult (Heplisav- B) 07/13/2000    Hepatitis B, Adult  (Energix-B/Recombivax HB) 07/13/2000    Hepatitis B, Peds (Engerix-B/Recombivax HB) 01/16/2001    Influenza (H1N1) 01/05/2010    Influenza Intranasal Vaccine 01/05/2010    Influenza Vaccine >6 months,quad, PF 02/14/2024    MMR (MMRII) 11/01/1985, 03/01/1990, 04/29/1997    Pneumococcal 23 valent 03/19/2013    Polio, Unspecified 1984, 01/01/1985, 04/04/1986, 10/01/1989    TD,PF 7+ (Tenivac) 08/01/1997, 08/28/2018    TDAP (Adacel,Boostrix) 03/26/2009    Td (Adult), Adsorbed 04/29/1997, 08/28/2018    Tdap (Adult) Unspecified Formulation 03/26/2009   Reminders:  Are they using prescription pain meds? No  Any first degree relatives with inflammatory bowel disease? (Crohn's, ulcerative colitis, etc) No  Any serious medical issues that require treatment and evaluation? No  Any conditions they are following closely with their PCP? No  Have you had any serious issues with previous Covid-19 immunizations? No  COVID Vaccine Screening   Have you received a dose of the Covid-19 vaccine before?   Yes, Moderna  Date of most recent Covid-19 vaccine dose:     01-March-2021   Do you currently have a health condition or are undergoing    treatment that makes you moderately to severely immunocompromised?* No  Have you ever had an allergic reaction to a Covid vaccine?**  No  Have you ever had an allergic reaction to another vaccine or injectable  medication?         No  Have you ever felt dizzy or faint before, during or after a shot?   No    *Ex: treatment of cancer, HIV, organ transplant recipient, immunosuppressive therapy, etc.     **This would include a severe allergic reaction (e.g., anaphylaxis that required treatment with epinephrine or caused you to go to the hospital. It would also include an allergic reaction that caused hives, swelling, or respiratory distress, including wheezing)    Is this subject eligible to receive a Covid-19 vaccine? Yes    Patient does fulfill study inclusion criteria and no exclusion criteria are  found. Subject will continue in the study. This decision was made at 9:34    28-MAY-2025    Amanda Fuentes NP

## 2025-06-23 ENCOUNTER — VIRTUAL VISIT (OUTPATIENT)
Dept: RESEARCH | Facility: CLINIC | Age: 40
End: 2025-06-23
Payer: COMMERCIAL

## 2025-06-23 DIAGNOSIS — Z00.6 EXAMINATION OF PARTICIPANT OR CONTROL IN CLINICAL RESEARCH: Primary | ICD-10-CM

## 2025-06-23 PROCEDURE — 99207 PR NO CHARGE-RESEARCH SERVICE: CPT | Mod: 93

## 2025-06-23 NOTE — PROGRESS NOTES
[place] Pre-Pill Day Call/Virtual Visit 1 Study Note    Study Description: The purpose of this study is to explore potential relationships between physiologic parameters collected from sensor data with physiological changes potentially induced by the administration of the COVID-19 vaccine.       Subject ID: [id number]     Virtual Check In Visit 1  Time of Visit (24H): 10:05    Date/Time of Onsite Visit 1 / Pill Appointment Confirmed? Yes    Has this subject completed 4 consecutive compliant days? No, but they have been deemed acceptable to proceed with Onsite Visit 1 given the HSA [place] outage.    Reminders  [x] Check compliance and address any issues  [x] Are you having any issues with your study devices? Yes: previously identified HSA outage  [x] Continue to follow nightly/daily study procedures   [x] Please bring your Study Watch, Study Phone, and Subject Instructions to your next appointment.   [x] Adhere to the lifestyle considerations listed in inclusion criteria number 7 (NSAIDs, Recreational drug use, strenuous exercise etc):    Particularly, REFRAIN from NSAID usage during the upcoming ingestion phase. The participant verbalized their understanding.   [x] IF they are a woman under 55, (male 39 year old)   Please be aware that we will need to perform a urine pregnancy test (UPT).    Additional Notes: N/A      Adverse Events Summary:*   Were any Adverse Events (AEs) experienced?  No    Protocol Deviation Summary:*   Were there any Protocol Deviations?:  No    *If Yes, please complete corresponding form.    Concomitant Medications Summary:    Has the subject taken any medications within 30 days prior to signing the informed consent and/or during the study? (Have they started any new medications since their last appointment?) No, they do not take any medications at this time.       23-JUN-2025     Maylin Crain        
[employer]

## 2025-06-24 ENCOUNTER — ALLIED HEALTH/NURSE VISIT (OUTPATIENT)
Dept: RESEARCH | Facility: CLINIC | Age: 40
End: 2025-06-24
Payer: COMMERCIAL

## 2025-06-24 DIAGNOSIS — Z00.6 EXAMINATION OF PARTICIPANT OR CONTROL IN CLINICAL RESEARCH: Primary | ICD-10-CM

## 2025-06-24 DIAGNOSIS — Z00.6 RESEARCH SUBJECT: ICD-10-CM

## 2025-06-24 PROCEDURE — 99207 PR NO CHARGE-RESEARCH SERVICE: CPT

## 2025-06-24 NOTE — PROGRESS NOTES
[place] Pill Day / On-Site 1 Note  Study Description: The purpose of this study is to explore potential relationships between physiologic parameters collected from sensor data with physiological changes potentially induced by the administration of the COVID-19 vaccine.    Subject ID: [id number]     Oli Gage presents to [facility] for On-Site 1 on 24-JUN-2025. All procedures below occurred on this date.     On-Site Visit 1                                                                Was the visit performed? Yes  Height/Weight and BMI confirmed? Yes  Repeat Height/Weight and BMI  There were no vitals taken for this visit.   Note: Capture of the values of Height/Weight and BMI confirmation have no bearing on recruitment binning or usable data binning. The participant will remain in their bin as assigned at screening/enrollment. Sponsor requested this information to be captured but not input in to [facility].      Pregnancy Test Needed? No  NSAID Usage: Has the participant taken an OTC pain relief or fever reducing medication or NSAID in the last 30 days? No   The participant was reminded to refrain from NSAID usage until a fever of 102.2 is reached and/or as recommended by a medical professional.     verbalized their understanding. Given this discussion today, their medication list has been updated to reflect today as the stop date of their NSAID.       CBT Device Kit Accountability   Was the [place] Dispensed? Yes  [place] ID: [id number]     Pill 1 Pill 2 Pill 3   Serial Number [id number] 23:10:73:[id number] 23:10:73:63   Lot Number [id number] 24-12 24-12   Pill Activation Time 10:34 10:34 10:36   Were the above pills activated and dispensed? Yes      Pills were activated by the Device Manager and verified by [facility].       CBT Pill Ingestion Log     Pill 1:  Was Pill 1 ingested? Yes  Time of Ingestion: 15:10     Ingestion of Pill 1 was witnessed by Tessa Fuentes NP. Please see their documentation for further  details.     Adverse Events Summary:*   Were any Adverse Events (AEs) experienced?  No    Protocol Deviation Summary:*   Were there any Protocol Deviations?:  No    *If Yes, please complete corresponding form.    Concomitant Medications Summary:    Has the subject taken any medications within 30 days prior to signing the informed consent and/or during the study? (Have they started any new medications since their last visit?) No, they do not take any medications at this time.       24-JUN-2025  Yamilet Jay

## 2025-06-24 NOTE — PROGRESS NOTES
[place] Study Reconsent    Study Description: The purpose of this study is to explore potential relationships between physiologic parameters collected from sensor data with physiological changes potentially induced by the administration of the COVID-19 vaccine.    Oli Gage a 39 year old male, was on-site today at [facility] for a study visit for the [place] (-[id number]) study.       The new version of the consent form was reviewed with the patient.     The review of the new version included:  Study Purpose      Participant Duration, Responsibilities & Expectations    Study Data and Devices  Benefits and Risks of Participation  Compensation and Costs of Participation - Additional information  Coded Study Data  Voluntary Participation    Study Restrictions  Confidentiality Obligations/Privacy-Related Risks   Injury, Legal, and Data Rights    Authorization to Use and Disclose Your Protected Health Information    Protocol Version: 4.0   Principle Investigator: Jenniffer Nassar MD    Subject Number: [id number]     The reconsent eConsent and HIPAA form version (Version 6.0 Date 23-JUN-2025) was signed on  24-JUN-2025. The updated copy of the consent form was provided to the participant and saved in the participant's medical record alongside the original consent.     All participants questions were answered and they are still willing to participate in the study.       24-JUN-2025    Yamilet Jay

## 2025-06-24 NOTE — PROGRESS NOTES
[place] Pill Witness Study Note  Study Description: The purpose of this study is to explore potential relationships between physiologic parameters collected from sensor data with physiological changes potentially induced by the administration of the COVID-19 vaccine.       I supervised while Oli Gage ingested the smart sensor. Smart sensor was swallowed without complication. Participant felt well after and was able proceeded with study procedures.       24-JUN-2025     Amanda Fuentes NP, NP

## 2025-06-26 ENCOUNTER — VIRTUAL VISIT (OUTPATIENT)
Dept: RESEARCH | Facility: CLINIC | Age: 40
End: 2025-06-26
Payer: COMMERCIAL

## 2025-06-26 DIAGNOSIS — Z00.6 RESEARCH SUBJECT: Primary | ICD-10-CM

## 2025-06-26 NOTE — PROGRESS NOTES
[place] Pre-Vaccine Call / Virtual Visit 2 Study Note    Study Description: The purpose of this study is to explore potential relationships between physiologic parameters collected from sensor data with physiological changes potentially induced by the administration of the COVID-19 vaccine.       Subject ID:      Was the visit performed? Yes  Location:     Virtual Visit 2: 26-JUN-2025  Time of Visit (24H): 10:00    Date/Time of Onsite Visit 2 / Vaccine Appointment Confirmed? Yes   Reminders  [x] Check compliance and address any issues   -If there are pending Igloo uploads, bring gateway close to the phone  [x] Are you having any issues with your study devices? No  [x] Continue to follow nightly/daily study procedures   [x] Are you sick today or experiencing any cold/flu-like symptoms today? No.    -If yes, investigate if rescheduling vaccine appointment is required  [x] Please remember to ingest Pill #3 tonight/the evening before your vaccine appointment   [x] Please bring your Study Watch, Study Phone, Subject Instructions AND GATEWAY to your appointment tomorrow.       Additional Notes: N/A      CBT Pill Ingestion Log     They were not with their ingestion sheet during this phone call and so they could not provide the time of Pill 2 ingestion. This will be captured in person at their onsite visit 2.     Adverse Events Summary:*   Were any Adverse Events (AEs) experienced?  No    Protocol Deviation Summary:*   Were there any Protocol Deviations?:  No    *If Yes, please complete corresponding form.    Concomitant Medications Summary:    Has the subject taken any medications within 30 days prior to signing the informed consent and/or during the study? (Have they started any new medications since their last appointment?)   No, they do not take any medications at this time.       26-JUN-2025     Lisa Simpson

## 2025-07-01 ENCOUNTER — VIRTUAL VISIT (OUTPATIENT)
Dept: RESEARCH | Facility: CLINIC | Age: 40
End: 2025-07-01
Payer: COMMERCIAL

## 2025-07-01 DIAGNOSIS — Z00.6 RESEARCH SUBJECT: Primary | ICD-10-CM

## 2025-07-01 PROCEDURE — 99207 PR NO CHARGE-RESEARCH SERVICE: CPT | Mod: 93

## 2025-07-01 NOTE — PROGRESS NOTES
[place] Post-VD Call / Virtual Visit 3 Note    Study Description: The purpose of this study is to explore potential relationships between physiologic parameters collected from sensor data with physiological changes potentially induced by the administration of the COVID-19 vaccine.       Subject ID:      Was the visit performed? Yes  Location:     Virtual Visit 3: 01-JUL-2025  Time of Visit (24H): 09:55      Reminders  [x] Check compliance and address any issues   -If there are pending Igloo uploads, bring gateway close to the phone  [x] Are you having any issues with your study devices? No   [x] Continue to follow nightly/daily study procedures  [x] Are you sick today or experiencing any cold/flu-like symptoms? No  [x] Please remember to ingest Pill #5 (and #6) according to the schedule described at your last appointment      Additional Notes: N/A    CBT Pill Ingestion Log   CBT sensor #5 turned red in the Igloo allyson prior to the participant ingesting it. The participant notified the study team regarding this issue. Participant was advised to not ingest it and to bring it back onsite at their end of study appointment.     Adverse Events Summary:*   Were any Adverse Events (AEs) experienced?  No    Protocol Deviation Summary:*   Were there any Protocol Deviations?:  No    *If Yes, please complete corresponding form.    Concomitant Medications Summary:    Has the subject taken any medications within 30 days prior to signing the informed consent and/or during the study? (Have they started any new medications since their last appointment?) No, they do not take any medications at this time.       01-JUL-2025   Lisa Simpson

## 2025-07-15 ENCOUNTER — ALLIED HEALTH/NURSE VISIT (OUTPATIENT)
Dept: RESEARCH | Facility: CLINIC | Age: 40
End: 2025-07-15
Payer: COMMERCIAL

## 2025-07-15 DIAGNOSIS — Z00.6 RESEARCH SUBJECT: Primary | ICD-10-CM

## 2025-07-15 PROCEDURE — 99207 PR NO CHARGE-RESEARCH SERVICE: CPT

## 2025-07-15 NOTE — PROGRESS NOTES
[place] Unscheduled Visit  Study Description: The purpose of this study is to explore potential relationships between physiologic parameters collected from sensor data with physiological changes potentially induced by the administration of the COVID-19 vaccine.       Subject ID:      Type of Visit: In Clinic Visit  Visit Date: 15-JUL-2025    Reason for Unscheduled Visit: Device Swap       [place] Device Accountability Returned Form    Was the Device Kit Returned? Yes   Date Device Kit Returned:  15-JUL-2025   Were There Device Issues?  Yes, with Study Phone   Was the Phone Returned?  Yes   Returned Phone ID: [id number]   Was the Watch Returned?  Yes   Was the Watch  [id number]   Was the [place] Returned? Yes   Returned [place] ID:  [id number]   Add/Delete rows as applicable      [place] REPLACEMENT Device Accountability  Prepped & Dispensed:  Was Study Kit #1 prepared? Yes   Date Devices Prepped & Checked: 15-JUL-2025   Is this a replacement kit?  Yes   Phone Included? Yes   Watch Included?  Yes   Ambient Sensor Included? No, N/A    Oral Thermometer Included?  No, N/A   Adapter and Charging Accessories Included? No, N/A     Study Phone  ID [employer] ID Igloo [place] ID    [id number]     Study Watch  ID Model  Band Type    [id number] Series 9 Sport Loop   Add/Delete sections as applicable    All devices listed above were dispensed to the participant. Device ID were confirmed prior to dispensation.      Was Study Kit #1 Shipped to the Participant? No  Were all expected devices received? Yes  Were there Device Issues? No  Was the Replacement Study Kit Replaced? No    Participant was thoroughly educated on study procedure and device care, staff highlighted the importance of compliance to study procedure. All questions and concerns were addressed, and informed participant to call study coordinators for any questions.     Adverse Events Summary:*   Were any Adverse Events (AEs) experienced?  No    Protocol Deviation  Summary:*   Were there any Protocol Deviations?:  No    *If Yes, please complete corresponding form.    Concomitant Medications Summary:    Has the subject taken any medications within 30 days prior to signing the informed consent and/or during the study? (Have they started any new medications since their last appointment?) No, they do not take any medications at this time.     15-JUL-2025  Maylin Crain

## 2025-07-15 NOTE — PROGRESS NOTES
"  [place] Study Sysdiagnose Consent    Study Description: The purpose of this study is to explore potential relationships between physiologic parameters collected from sensor data with physiological changes potentially induced by the administration of the COVID-19 vaccine.    Oli Gage was on-site today at [facility] to troubleshoot devices with study staff. All troubleshooting tasks were was done on the study devices and the issue could not be resolved. To determine the cause of data collection or transfer errors, a diagnostic report, a \"Sysdiagnose\", was generated.     The Separate Voluntary Consent to Provide Software Diagnostic Information was reviewed with the participant.      The review of the consent included:  Diagnostic Data Collection     What Happens to Generated Diagnostic Data  What Diagnostic Data is Collected  Privacy and Data Protection   Benefits and Risks of Participation    Compensation    Protocol Version: 6.0   Principle Investigator: Jenniffer Nassar MD    Subject Number: [id number]      The Separate Voluntary Consent to Provide Software Diagnostic Information eConsent form version (Version 1.0 Date 8-Jul-2024) was signed on  15-JUL-2025. This supplemental consent form was provided to the participant and saved in the participant's medical record.     All participants questions were answered and they are still willing to participate in the study.       15-JUL-2025    Maylin Crain"

## 2025-07-24 ENCOUNTER — VIRTUAL VISIT (OUTPATIENT)
Dept: RESEARCH | Facility: CLINIC | Age: 40
End: 2025-07-24
Payer: COMMERCIAL

## 2025-07-24 DIAGNOSIS — Z00.6 RESEARCH SUBJECT: Primary | ICD-10-CM

## 2025-07-24 NOTE — PROGRESS NOTES
[place] EOS Call / Virtual Visit 4 Note    Study Description: The purpose of this study is to explore potential relationships between physiologic parameters collected from sensor data with physiological changes potentially induced by the administration of the COVID-19 vaccine.       Subject ID:      Was the visit performed? Yes    Reminders  [x] Check compliance and address any issues   -If there are pending Igloo uploads, bring gateway close to the phone  [x] Are you having any issues with your study devices? No   [x] New Procedures   -For the next 4 days only do the morning survey and leave devices on the     -Basically do whatever surveys present themselves in the HSA allyson     -They need to do the evening survey and wear the watch on night 45   -On page 12 of the Subject Instruction Packet  [x] If you have not removed your No MRI bracelet by now, please do so  [x] Confirm end of study visit      Additional Notes:  N/A    CBT Pill Ingestion Log     This data was previously captured.     For [facility] Unscheduled Visit for this Call:   Subject was instructed on the procedures for the last 4 days of study participation. Device issues and compliance were reviewed and addressed. The end of study appointment date and time were confirmed. Subject was reminded to bring their study devices with them.     Adverse Events Summary:*   Were any Adverse Events (AEs) experienced?  No    Protocol Deviation Summary:*   Were there any Protocol Deviations?:  No    *If Yes, please complete corresponding form.    Concomitant Medications Summary:    Has the subject taken any medications within 30 days prior to signing the informed consent and/or during the study? (Have they started any new medications since their last appointment?) No, they do not take any medications at this time.       24-JUL-2025   El Holbrook

## 2025-07-28 ENCOUNTER — ALLIED HEALTH/NURSE VISIT (OUTPATIENT)
Dept: RESEARCH | Facility: CLINIC | Age: 40
End: 2025-07-28
Payer: COMMERCIAL

## 2025-07-28 DIAGNOSIS — Z00.6 RESEARCH SUBJECT: Primary | ICD-10-CM

## 2025-07-28 PROCEDURE — 99207 PR NO CHARGE-RESEARCH SERVICE: CPT

## 2025-07-28 NOTE — PROGRESS NOTES
[place] End of Study Note  -Including Device Accountability Returned and Subject Disposition    Study Description: The purpose of this study is to explore potential relationships between physiologic parameters collected from sensor data with physiological changes potentially induced by the administration of the COVID-19 vaccine.       Subject ID:        Was the visit performed?  Yes   Was Study Exit Survey Completed on the study phone?: Yes    RECONSENT Needed? No    Subject Disposition:  Did the subject complete the study? Yes   If no, Why? N/A    Which Visit did the participant exit from the study? End of Study Visit  Study Completion Date: 28-JUL-2025      [place] Device Accountability Returned Form    Was the Device Kit Returned? Yes   Date Device Kit Returned:  28-JUL-2025   Were There Device Issues?  No   Was the Phone Returned?   Returned Phone ID: Yes  [id number]   Was the Watch Returned?   Returned Watch ID: Yes  [id number]   Was the [place] Returned?   Returned [place] ID: Yes  [id number]   Were All Chargers and Accessories Returned?  Yes        Adverse Events Summary:*   Were any Adverse Events (AEs) experienced? No    Protocol Deviation Summary:*   Were there any Protocol Deviations?:  No    *If Yes, please complete corresponding form.    Concomitant Medications Summary:    Has the subject taken any medications within 30 days prior to signing the informed consent and/or during the study? (Have they started any new medications?) No, they do not take any medications at this time.     28-JUL-2025   Yamilet Jay